# Patient Record
Sex: MALE | Race: WHITE | NOT HISPANIC OR LATINO | Employment: FULL TIME | URBAN - METROPOLITAN AREA
[De-identification: names, ages, dates, MRNs, and addresses within clinical notes are randomized per-mention and may not be internally consistent; named-entity substitution may affect disease eponyms.]

---

## 2017-02-02 ENCOUNTER — HOSPITAL ENCOUNTER (EMERGENCY)
Facility: HOSPITAL | Age: 22
Discharge: HOME | End: 2017-02-03
Attending: EMERGENCY MEDICINE | Admitting: EMERGENCY MEDICINE

## 2017-02-02 DIAGNOSIS — R04.0 EPISTAXIS: Primary | ICD-10-CM

## 2017-02-02 PROCEDURE — 99283 EMERGENCY DEPT VISIT LOW MDM: CPT

## 2017-02-03 ENCOUNTER — APPOINTMENT (OUTPATIENT)
Dept: GENERAL RADIOLOGY | Facility: HOSPITAL | Age: 22
End: 2017-02-03

## 2017-02-03 VITALS
OXYGEN SATURATION: 92 % | HEART RATE: 90 BPM | RESPIRATION RATE: 16 BRPM | SYSTOLIC BLOOD PRESSURE: 123 MMHG | TEMPERATURE: 96.4 F | HEIGHT: 71 IN | BODY MASS INDEX: 39.2 KG/M2 | WEIGHT: 280 LBS | DIASTOLIC BLOOD PRESSURE: 80 MMHG

## 2017-02-03 PROCEDURE — 71020 HC CHEST PA AND LATERAL: CPT

## 2017-02-03 NOTE — ED PROVIDER NOTES
EMERGENCY DEPARTMENT ENCOUNTER    CHIEF COMPLAINT  Chief Complaint: epistaxis and hemoptysis   History given by: patient   History limited by: nothing   Room Number: 14/14  PMD: No Known Provider    HPI:  Pt is a 22 y.o. male who presents complaining of intermittent, right sided epistaxis and hemoptysis for a few days. Pt denies chest pain, SOA, anausea, and any other sx at this time. Pt has had nosebleeds in the past, but never frequently until now. Pt states he has had 5-6 episode of epistaxis in the past few days.     Duration:  days  Timing: intermittent   Location: right nares  Radiation: does not radiate   Quality: new  Intensity/Severity: mild  Progression: unchanged   Associated Symptoms: epistaxis, hemoptysis   Aggravating Factors: none specified   Alleviating Factors: none specified   Previous Episodes: yes but never frequently until now  Treatment before arrival: none specified     PAST MEDICAL HISTORY  Active Ambulatory Problems     Diagnosis Date Noted   • No Active Ambulatory Problems     Resolved Ambulatory Problems     Diagnosis Date Noted   • No Resolved Ambulatory Problems     Past Medical History   Diagnosis Date   • Anxiety    • Bronchitis, acute    • Environmental and seasonal allergies    • Gingivitis        PAST SURGICAL HISTORY  Past Surgical History   Procedure Laterality Date   • Tonsillectomy         FAMILY HISTORY  History reviewed. No pertinent family history.    SOCIAL HISTORY  Social History     Social History   • Marital status: Single     Spouse name: N/A   • Number of children: N/A   • Years of education: N/A     Occupational History   • Not on file.     Social History Main Topics   • Smoking status: Never Smoker   • Smokeless tobacco: Never Used   • Alcohol use No   • Drug use: No   • Sexual activity: Not on file     Other Topics Concern   • Not on file     Social History Narrative       ALLERGIES  Dust mite extract and Pollen extract    REVIEW OF SYSTEMS  Review of Systems    Constitutional: Negative for activity change, appetite change and fever.   HENT: Positive for nosebleeds. Negative for congestion and sore throat.    Eyes: Negative.    Respiratory: Positive for cough (blood). Negative for shortness of breath.    Cardiovascular: Negative for chest pain and leg swelling.   Gastrointestinal: Negative for abdominal pain, diarrhea and vomiting.   Endocrine: Negative.    Genitourinary: Negative for decreased urine volume and dysuria.   Musculoskeletal: Negative for neck pain.   Skin: Negative for rash and wound.   Allergic/Immunologic: Negative.    Neurological: Negative for weakness, numbness and headaches.   Hematological: Negative.    Psychiatric/Behavioral: Negative.    All other systems reviewed and are negative.      PHYSICAL EXAM  ED Triage Vitals   Temp Heart Rate Resp BP SpO2   02/02/17 2137 02/02/17 2137 02/02/17 2144 02/02/17 2144 02/02/17 2137   96.4 °F (35.8 °C) 80 18 122/84 98 %      Temp src Heart Rate Source Patient Position BP Location FiO2 (%)   02/02/17 2137 02/02/17 2137 -- -- --   Tympanic Monitor          Physical Exam   Constitutional: He is oriented to person, place, and time and well-developed, well-nourished, and in no distress.   HENT:   Head: Normocephalic and atraumatic.   Nose: No epistaxis.   Moderate clear mucous in both nares.    Eyes: EOM are normal. Pupils are equal, round, and reactive to light.   Neck: Normal range of motion. Neck supple.   Cardiovascular: Normal rate, regular rhythm and normal heart sounds.    Pulmonary/Chest: Effort normal and breath sounds normal. No respiratory distress.   Abdominal: Soft. There is no tenderness. There is no rebound and no guarding.   Musculoskeletal: Normal range of motion. He exhibits no edema.   Neurological: He is alert and oriented to person, place, and time. He has normal sensation and normal strength.   Skin: Skin is warm and dry.   Psychiatric: Mood and affect normal.   Nursing note and vitals  reviewed.      RADIOLOGY  XR Chest 2 View   Preliminary Result   No acute findings.  No significant change.             I ordered the above noted radiological studies. Interpreted by radiologist. Reviewed by me in PACS.       PROCEDURES  Procedures      PROGRESS AND CONSULTS  ED Course     0039: I will order CXR since pt has complained of hemoptysis. Pt agrees to f/u with ENT.     0152: Rechecked pt. Pt is resting comfortably. Discussed negative CXR. I recommended that pt use Afrin and saline spray and f/u with ENT. Pt understands and agrees with plan and all questions were addressed.    MEDICAL DECISION MAKING  Results were reviewed/discussed with the patient and they were also made aware of online access. Pt also made aware that some labs, such as cultures, will not be resulted during ER visit and follow up with PMD is necessary.     MDM  Number of Diagnoses or Management Options  Epistaxis:      Amount and/or Complexity of Data Reviewed  Tests in the radiology section of CPT®: ordered and reviewed    Patient Progress  Patient progress: stable         DIAGNOSIS  Final diagnoses:   Epistaxis       DISPOSITION  DISCHARGE    Patient discharged in stable condition.    Reviewed implications of results, diagnosis, meds, responsibility to follow up, warning signs and symptoms of possible worsening, potential complications and reasons to return to ER.    Patient/Family voiced understanding of above instructions.    Discussed plan for discharge, as there is no emergent indication for admission.  Pt/family is agreeable and understands need for follow up and repeat testing.  Pt is aware that discharge does not mean that nothing is wrong but it indicates no emergency is present that requires admission and they must continue care with follow-up as given below or physician of their choice.     FOLLOW-UP  Antonio Villalobos MD  [address] [id number] [phone]    Schedule an appointment as soon as  possible for a visit           Medication List      Stop          HYDROcodone-acetaminophen 5-325 MG per tablet   Commonly known as:  NORCO       predniSONE 50 MG tablet   Commonly known as:  DELTASONE       promethazine-dextromethorphan 6.25-15 MG/5ML syrup   Commonly known as:  PROMETHAZINE-DM           Latest Documented Vital Signs:  As of 1:51 AM  BP- 133/83 HR- 86 Temp- 96.4 °F (35.8 °C) (Tympanic) O2 sat- 96%    --  Documentation assistance provided by sudeep Guo for Hubert Mendoza.  Information recorded by the scribe was done at my direction and has been verified and validated by me.         Vale Guo  02/03/17 0153       Hubert Mendoza MD  02/03/17 0703

## 2017-02-03 NOTE — ED NOTES
Patient states he has been having nose bleeds, is currently not having one but now when he coughs there is some blood in his sputum.      Praveena Squires RN  02/02/17 4466

## 2017-02-03 NOTE — DISCHARGE INSTRUCTIONS
Saline nasal spray every 2-3 hours while awake.  Afrin nasal spray 2 sprays each nostril twice daily.

## 2017-05-19 ENCOUNTER — HOSPITAL ENCOUNTER (EMERGENCY)
Facility: HOSPITAL | Age: 22
Discharge: HOME | End: 2017-05-19
Attending: EMERGENCY MEDICINE | Admitting: EMERGENCY MEDICINE

## 2017-05-19 ENCOUNTER — APPOINTMENT (OUTPATIENT)
Dept: GENERAL RADIOLOGY | Facility: HOSPITAL | Age: 22
End: 2017-05-19

## 2017-05-19 VITALS
TEMPERATURE: 97.9 F | SYSTOLIC BLOOD PRESSURE: 127 MMHG | OXYGEN SATURATION: 97 % | BODY MASS INDEX: 40.09 KG/M2 | HEIGHT: 70 IN | WEIGHT: 280 LBS | DIASTOLIC BLOOD PRESSURE: 80 MMHG | RESPIRATION RATE: 18 BRPM | HEART RATE: 85 BPM

## 2017-05-19 DIAGNOSIS — Z76.0 MEDICATION REFILL: ICD-10-CM

## 2017-05-19 DIAGNOSIS — J18.9 PNEUMONIA OF RIGHT MIDDLE LOBE DUE TO INFECTIOUS ORGANISM: Primary | ICD-10-CM

## 2017-05-19 LAB
ALBUMIN SERPL-MCNC: 4.1 G/DL (ref 3.5–5.2)
ALBUMIN/GLOB SERPL: 1 G/DL
ALP SERPL-CCNC: 58 U/L (ref 39–117)
ALT SERPL W P-5'-P-CCNC: 58 U/L (ref 1–41)
ANION GAP SERPL CALCULATED.3IONS-SCNC: 14.1 MMOL/L
AST SERPL-CCNC: 41 U/L (ref 1–40)
BASOPHILS # BLD AUTO: 0.03 10*3/MM3 (ref 0–0.2)
BASOPHILS NFR BLD AUTO: 0.3 % (ref 0–1.5)
BILIRUB SERPL-MCNC: 0.3 MG/DL (ref 0.1–1.2)
BUN BLD-MCNC: 8 MG/DL (ref 6–20)
BUN/CREAT SERPL: 9 (ref 7–25)
CALCIUM SPEC-SCNC: 9.4 MG/DL (ref 8.6–10.5)
CHLORIDE SERPL-SCNC: 104 MMOL/L (ref 98–107)
CO2 SERPL-SCNC: 20.9 MMOL/L (ref 22–29)
CREAT BLD-MCNC: 0.89 MG/DL (ref 0.76–1.27)
DEPRECATED RDW RBC AUTO: 42 FL (ref 37–54)
EOSINOPHIL # BLD AUTO: 0.27 10*3/MM3 (ref 0–0.7)
EOSINOPHIL NFR BLD AUTO: 2.7 % (ref 0.3–6.2)
ERYTHROCYTE [DISTWIDTH] IN BLOOD BY AUTOMATED COUNT: 13.3 % (ref 11.5–14.5)
GFR SERPL CREATININE-BSD FRML MDRD: 109 ML/MIN/1.73
GLOBULIN UR ELPH-MCNC: 4.1 GM/DL
GLUCOSE BLD-MCNC: 105 MG/DL (ref 65–99)
HCT VFR BLD AUTO: 44.7 % (ref 40.4–52.2)
HGB BLD-MCNC: 15.3 G/DL (ref 13.7–17.6)
IMM GRANULOCYTES # BLD: 0.08 10*3/MM3 (ref 0–0.03)
IMM GRANULOCYTES NFR BLD: 0.8 % (ref 0–0.5)
LYMPHOCYTES # BLD AUTO: 2.65 10*3/MM3 (ref 0.9–4.8)
LYMPHOCYTES NFR BLD AUTO: 26.7 % (ref 19.6–45.3)
MCH RBC QN AUTO: 29.5 PG (ref 27–32.7)
MCHC RBC AUTO-ENTMCNC: 34.2 G/DL (ref 32.6–36.4)
MCV RBC AUTO: 86.3 FL (ref 79.8–96.2)
MONOCYTES # BLD AUTO: 0.92 10*3/MM3 (ref 0.2–1.2)
MONOCYTES NFR BLD AUTO: 9.3 % (ref 5–12)
NEUTROPHILS # BLD AUTO: 5.98 10*3/MM3 (ref 1.9–8.1)
NEUTROPHILS NFR BLD AUTO: 60.2 % (ref 42.7–76)
PLATELET # BLD AUTO: 259 10*3/MM3 (ref 140–500)
PMV BLD AUTO: 9.8 FL (ref 6–12)
POTASSIUM BLD-SCNC: 3.8 MMOL/L (ref 3.5–5.2)
PROT SERPL-MCNC: 8.2 G/DL (ref 6–8.5)
RBC # BLD AUTO: 5.18 10*6/MM3 (ref 4.6–6)
SODIUM BLD-SCNC: 139 MMOL/L (ref 136–145)
WBC NRBC COR # BLD: 9.93 10*3/MM3 (ref 4.5–10.7)

## 2017-05-19 PROCEDURE — 71020 HC CHEST PA AND LATERAL: CPT

## 2017-05-19 PROCEDURE — 99283 EMERGENCY DEPT VISIT LOW MDM: CPT

## 2017-05-19 PROCEDURE — 85025 COMPLETE CBC W/AUTO DIFF WBC: CPT | Performed by: NURSE PRACTITIONER

## 2017-05-19 PROCEDURE — 80053 COMPREHEN METABOLIC PANEL: CPT | Performed by: NURSE PRACTITIONER

## 2017-05-19 RX ORDER — DOXYCYCLINE 100 MG/1
100 CAPSULE ORAL 2 TIMES DAILY
Qty: 14 CAPSULE | Refills: 0 | Status: SHIPPED | OUTPATIENT
Start: 2017-05-19 | End: 2017-06-02 | Stop reason: HOSPADM

## 2017-05-19 RX ORDER — BENZONATATE 100 MG/1
100 CAPSULE ORAL 3 TIMES DAILY PRN
Qty: 21 CAPSULE | Refills: 0 | Status: SHIPPED | OUTPATIENT
Start: 2017-05-19 | End: 2018-09-16

## 2017-05-19 RX ORDER — PAROXETINE HYDROCHLORIDE 20 MG/1
20 TABLET, FILM COATED ORAL EVERY MORNING
Qty: 30 TABLET | Refills: 0 | Status: SHIPPED | OUTPATIENT
Start: 2017-05-19 | End: 2018-09-16

## 2017-05-31 ENCOUNTER — APPOINTMENT (OUTPATIENT)
Dept: CT IMAGING | Facility: HOSPITAL | Age: 22
End: 2017-05-31

## 2017-05-31 ENCOUNTER — HOSPITAL ENCOUNTER (INPATIENT)
Facility: HOSPITAL | Age: 22
LOS: 2 days | Discharge: HOME | End: 2017-06-02
Attending: EMERGENCY MEDICINE | Admitting: INTERNAL MEDICINE

## 2017-05-31 ENCOUNTER — APPOINTMENT (OUTPATIENT)
Dept: GENERAL RADIOLOGY | Facility: HOSPITAL | Age: 22
End: 2017-05-31

## 2017-05-31 DIAGNOSIS — A41.9 SEPSIS, DUE TO UNSPECIFIED ORGANISM: ICD-10-CM

## 2017-05-31 DIAGNOSIS — J18.9 PNEUMONIA OF BOTH LUNGS DUE TO INFECTIOUS ORGANISM, UNSPECIFIED PART OF LUNG: Primary | ICD-10-CM

## 2017-05-31 PROBLEM — E87.20 ACIDOSIS: Status: ACTIVE | Noted: 2017-05-31

## 2017-05-31 PROBLEM — R19.7 ACUTE DIARRHEA: Status: ACTIVE | Noted: 2017-05-31

## 2017-05-31 PROBLEM — R10.31 RLQ ABDOMINAL PAIN: Status: ACTIVE | Noted: 2017-05-31

## 2017-05-31 LAB
ALBUMIN SERPL-MCNC: 4.6 G/DL (ref 3.5–5.2)
ALBUMIN/GLOB SERPL: 1.2 G/DL
ALP SERPL-CCNC: 51 U/L (ref 39–117)
ALT SERPL W P-5'-P-CCNC: 68 U/L (ref 1–41)
ANION GAP SERPL CALCULATED.3IONS-SCNC: 17.9 MMOL/L
AST SERPL-CCNC: 48 U/L (ref 1–40)
B PERT DNA SPEC QL NAA+PROBE: NOT DETECTED
BASOPHILS # BLD AUTO: 0.02 10*3/MM3 (ref 0–0.2)
BASOPHILS NFR BLD AUTO: 0.1 % (ref 0–1.5)
BILIRUB SERPL-MCNC: 1.1 MG/DL (ref 0.1–1.2)
BUN BLD-MCNC: 9 MG/DL (ref 6–20)
BUN/CREAT SERPL: 8.7 (ref 7–25)
C PNEUM DNA NPH QL NAA+NON-PROBE: NOT DETECTED
CALCIUM SPEC-SCNC: 9.6 MG/DL (ref 8.6–10.5)
CHLORIDE SERPL-SCNC: 95 MMOL/L (ref 98–107)
CO2 SERPL-SCNC: 19.1 MMOL/L (ref 22–29)
CREAT BLD-MCNC: 1.03 MG/DL (ref 0.76–1.27)
D-LACTATE SERPL-SCNC: 3.2 MMOL/L (ref 0.5–2)
DEPRECATED RDW RBC AUTO: 39.3 FL (ref 37–54)
EOSINOPHIL # BLD AUTO: 0.01 10*3/MM3 (ref 0–0.7)
EOSINOPHIL NFR BLD AUTO: 0.1 % (ref 0.3–6.2)
ERYTHROCYTE [DISTWIDTH] IN BLOOD BY AUTOMATED COUNT: 13.2 % (ref 11.5–14.5)
FLUAV H1 2009 PAND RNA NPH QL NAA+PROBE: NOT DETECTED
FLUAV H1 HA GENE NPH QL NAA+PROBE: NOT DETECTED
FLUAV H3 RNA NPH QL NAA+PROBE: NOT DETECTED
FLUAV SUBTYP SPEC NAA+PROBE: NOT DETECTED
FLUBV RNA ISLT QL NAA+PROBE: NOT DETECTED
GFR SERPL CREATININE-BSD FRML MDRD: 92 ML/MIN/1.73
GLOBULIN UR ELPH-MCNC: 3.9 GM/DL
GLUCOSE BLD-MCNC: 116 MG/DL (ref 65–99)
HADV DNA SPEC NAA+PROBE: NOT DETECTED
HCOV 229E RNA SPEC QL NAA+PROBE: NOT DETECTED
HCOV HKU1 RNA SPEC QL NAA+PROBE: NOT DETECTED
HCOV NL63 RNA SPEC QL NAA+PROBE: NOT DETECTED
HCOV OC43 RNA SPEC QL NAA+PROBE: NOT DETECTED
HCT VFR BLD AUTO: 44.8 % (ref 40.4–52.2)
HGB BLD-MCNC: 15.9 G/DL (ref 13.7–17.6)
HMPV RNA NPH QL NAA+NON-PROBE: NOT DETECTED
HPIV1 RNA SPEC QL NAA+PROBE: NOT DETECTED
HPIV2 RNA SPEC QL NAA+PROBE: NOT DETECTED
HPIV3 RNA NPH QL NAA+PROBE: NOT DETECTED
HPIV4 P GENE NPH QL NAA+PROBE: NOT DETECTED
IMM GRANULOCYTES # BLD: 0.06 10*3/MM3 (ref 0–0.03)
IMM GRANULOCYTES NFR BLD: 0.3 % (ref 0–0.5)
LYMPHOCYTES # BLD AUTO: 0.82 10*3/MM3 (ref 0.9–4.8)
LYMPHOCYTES NFR BLD AUTO: 4.4 % (ref 19.6–45.3)
M PNEUMO IGG SER IA-ACNC: NOT DETECTED
MCH RBC QN AUTO: 29.4 PG (ref 27–32.7)
MCHC RBC AUTO-ENTMCNC: 35.5 G/DL (ref 32.6–36.4)
MCV RBC AUTO: 83 FL (ref 79.8–96.2)
MONOCYTES # BLD AUTO: 0.57 10*3/MM3 (ref 0.2–1.2)
MONOCYTES NFR BLD AUTO: 3.1 % (ref 5–12)
NEUTROPHILS # BLD AUTO: 17.16 10*3/MM3 (ref 1.9–8.1)
NEUTROPHILS NFR BLD AUTO: 92 % (ref 42.7–76)
PLAT MORPH BLD: NORMAL
PLATELET # BLD AUTO: 268 10*3/MM3 (ref 140–500)
PMV BLD AUTO: 9.8 FL (ref 6–12)
POTASSIUM BLD-SCNC: 3.9 MMOL/L (ref 3.5–5.2)
PROCALCITONIN SERPL-MCNC: 2.51 NG/ML (ref 0.1–0.25)
PROT SERPL-MCNC: 8.5 G/DL (ref 6–8.5)
RBC # BLD AUTO: 5.4 10*6/MM3 (ref 4.6–6)
RBC MORPH BLD: NORMAL
RHINOVIRUS RNA SPEC NAA+PROBE: NOT DETECTED
RSV RNA NPH QL NAA+NON-PROBE: NOT DETECTED
SODIUM BLD-SCNC: 132 MMOL/L (ref 136–145)
WBC MORPH BLD: NORMAL
WBC NRBC COR # BLD: 18.64 10*3/MM3 (ref 4.5–10.7)

## 2017-05-31 PROCEDURE — 36415 COLL VENOUS BLD VENIPUNCTURE: CPT

## 2017-05-31 PROCEDURE — 0 IOPAMIDOL 61 % SOLUTION: Performed by: INTERNAL MEDICINE

## 2017-05-31 PROCEDURE — 84145 PROCALCITONIN (PCT): CPT | Performed by: EMERGENCY MEDICINE

## 2017-05-31 PROCEDURE — 87581 M.PNEUMON DNA AMP PROBE: CPT | Performed by: EMERGENCY MEDICINE

## 2017-05-31 PROCEDURE — 87486 CHLMYD PNEUM DNA AMP PROBE: CPT | Performed by: EMERGENCY MEDICINE

## 2017-05-31 PROCEDURE — 85025 COMPLETE CBC W/AUTO DIFF WBC: CPT | Performed by: EMERGENCY MEDICINE

## 2017-05-31 PROCEDURE — 80053 COMPREHEN METABOLIC PANEL: CPT | Performed by: EMERGENCY MEDICINE

## 2017-05-31 PROCEDURE — 87633 RESP VIRUS 12-25 TARGETS: CPT | Performed by: EMERGENCY MEDICINE

## 2017-05-31 PROCEDURE — 74177 CT ABD & PELVIS W/CONTRAST: CPT

## 2017-05-31 PROCEDURE — 87798 DETECT AGENT NOS DNA AMP: CPT | Performed by: EMERGENCY MEDICINE

## 2017-05-31 PROCEDURE — 25010000002 PIPERACILLIN SOD-TAZOBACTAM PER 1 G: Performed by: EMERGENCY MEDICINE

## 2017-05-31 PROCEDURE — 70450 CT HEAD/BRAIN W/O DYE: CPT

## 2017-05-31 PROCEDURE — 87040 BLOOD CULTURE FOR BACTERIA: CPT | Performed by: EMERGENCY MEDICINE

## 2017-05-31 PROCEDURE — 71020 HC CHEST PA AND LATERAL: CPT

## 2017-05-31 PROCEDURE — 71260 CT THORAX DX C+: CPT

## 2017-05-31 PROCEDURE — 85007 BL SMEAR W/DIFF WBC COUNT: CPT | Performed by: EMERGENCY MEDICINE

## 2017-05-31 PROCEDURE — 83605 ASSAY OF LACTIC ACID: CPT | Performed by: EMERGENCY MEDICINE

## 2017-05-31 PROCEDURE — 99284 EMERGENCY DEPT VISIT MOD MDM: CPT

## 2017-05-31 RX ORDER — CEFTRIAXONE SODIUM 1 G/50ML
1 INJECTION, SOLUTION INTRAVENOUS EVERY 24 HOURS
Status: DISCONTINUED | OUTPATIENT
Start: 2017-05-31 | End: 2017-06-02 | Stop reason: HOSPADM

## 2017-05-31 RX ORDER — ACETAMINOPHEN 500 MG
1000 TABLET ORAL ONCE
Status: COMPLETED | OUTPATIENT
Start: 2017-05-31 | End: 2017-05-31

## 2017-05-31 RX ORDER — ALBUTEROL SULFATE 2.5 MG/3ML
2.5 SOLUTION RESPIRATORY (INHALATION) EVERY 6 HOURS PRN
Status: DISCONTINUED | OUTPATIENT
Start: 2017-05-31 | End: 2017-06-02 | Stop reason: HOSPADM

## 2017-05-31 RX ORDER — PAROXETINE HYDROCHLORIDE 20 MG/1
20 TABLET, FILM COATED ORAL EVERY MORNING
Status: DISCONTINUED | OUTPATIENT
Start: 2017-06-01 | End: 2017-06-02 | Stop reason: HOSPADM

## 2017-05-31 RX ORDER — ACETAMINOPHEN 325 MG/1
650 TABLET ORAL EVERY 4 HOURS PRN
Status: DISCONTINUED | OUTPATIENT
Start: 2017-05-31 | End: 2017-06-02 | Stop reason: HOSPADM

## 2017-05-31 RX ORDER — CETIRIZINE HYDROCHLORIDE 10 MG/1
10 TABLET ORAL DAILY
Status: DISCONTINUED | OUTPATIENT
Start: 2017-06-01 | End: 2017-06-02 | Stop reason: HOSPADM

## 2017-05-31 RX ORDER — GUAIFENESIN 600 MG/1
1200 TABLET, EXTENDED RELEASE ORAL 2 TIMES DAILY
Status: DISCONTINUED | OUTPATIENT
Start: 2017-06-01 | End: 2017-06-02

## 2017-05-31 RX ORDER — ONDANSETRON 2 MG/ML
4 INJECTION INTRAMUSCULAR; INTRAVENOUS EVERY 6 HOURS PRN
Status: DISCONTINUED | OUTPATIENT
Start: 2017-05-31 | End: 2017-06-02 | Stop reason: HOSPADM

## 2017-05-31 RX ORDER — SODIUM CHLORIDE 0.9 % (FLUSH) 0.9 %
1-10 SYRINGE (ML) INJECTION AS NEEDED
Status: DISCONTINUED | OUTPATIENT
Start: 2017-05-31 | End: 2017-06-02 | Stop reason: HOSPADM

## 2017-05-31 RX ORDER — SODIUM CHLORIDE 9 MG/ML
125 INJECTION, SOLUTION INTRAVENOUS CONTINUOUS
Status: DISCONTINUED | OUTPATIENT
Start: 2017-05-31 | End: 2017-06-02 | Stop reason: HOSPADM

## 2017-05-31 RX ADMIN — SODIUM CHLORIDE 1000 ML: 9 INJECTION, SOLUTION INTRAVENOUS at 20:26

## 2017-05-31 RX ADMIN — TAZOBACTAM SODIUM AND PIPERACILLIN SODIUM 4.5 G: 500; 4 INJECTION, SOLUTION INTRAVENOUS at 21:41

## 2017-05-31 RX ADMIN — SODIUM CHLORIDE 3810 ML: 9 INJECTION, SOLUTION INTRAVENOUS at 21:43

## 2017-05-31 RX ADMIN — DOXYCYCLINE 100 MG: 100 INJECTION, POWDER, LYOPHILIZED, FOR SOLUTION INTRAVENOUS at 22:17

## 2017-05-31 RX ADMIN — IOPAMIDOL 85 ML: 612 INJECTION, SOLUTION INTRAVENOUS at 23:25

## 2017-05-31 RX ADMIN — ACETAMINOPHEN 1000 MG: 500 TABLET ORAL at 21:01

## 2017-06-01 LAB
ALBUMIN SERPL-MCNC: 3.5 G/DL (ref 3.5–5.2)
ALBUMIN/GLOB SERPL: 1 G/DL
ALP SERPL-CCNC: 39 U/L (ref 39–117)
ALT SERPL W P-5'-P-CCNC: 48 U/L (ref 1–41)
ANION GAP SERPL CALCULATED.3IONS-SCNC: 14.7 MMOL/L
AST SERPL-CCNC: 27 U/L (ref 1–40)
BASOPHILS # BLD AUTO: 0.02 10*3/MM3 (ref 0–0.2)
BASOPHILS NFR BLD AUTO: 0.1 % (ref 0–1.5)
BILIRUB SERPL-MCNC: 0.8 MG/DL (ref 0.1–1.2)
BUN BLD-MCNC: 9 MG/DL (ref 6–20)
BUN/CREAT SERPL: 9.7 (ref 7–25)
C DIFF TOX GENS STL QL NAA+PROBE: NEGATIVE
CALCIUM SPEC-SCNC: 8.5 MG/DL (ref 8.6–10.5)
CHLORIDE SERPL-SCNC: 107 MMOL/L (ref 98–107)
CO2 SERPL-SCNC: 19.3 MMOL/L (ref 22–29)
CREAT BLD-MCNC: 0.93 MG/DL (ref 0.76–1.27)
D-LACTATE SERPL-SCNC: 1.4 MMOL/L (ref 0.5–2)
D-LACTATE SERPL-SCNC: 2.8 MMOL/L (ref 0.5–2)
DEPRECATED RDW RBC AUTO: 40 FL (ref 37–54)
EOSINOPHIL # BLD AUTO: 0.05 10*3/MM3 (ref 0–0.7)
EOSINOPHIL NFR BLD AUTO: 0.2 % (ref 0.3–6.2)
ERYTHROCYTE [DISTWIDTH] IN BLOOD BY AUTOMATED COUNT: 13.4 % (ref 11.5–14.5)
GFR SERPL CREATININE-BSD FRML MDRD: 104 ML/MIN/1.73
GLOBULIN UR ELPH-MCNC: 3.4 GM/DL
GLUCOSE BLD-MCNC: 126 MG/DL (ref 65–99)
HCT VFR BLD AUTO: 38.3 % (ref 40.4–52.2)
HGB BLD-MCNC: 13.6 G/DL (ref 13.7–17.6)
IMM GRANULOCYTES # BLD: 0.08 10*3/MM3 (ref 0–0.03)
IMM GRANULOCYTES NFR BLD: 0.4 % (ref 0–0.5)
L PNEUMO1 AG UR QL IA: NEGATIVE
LYMPHOCYTES # BLD AUTO: 2.92 10*3/MM3 (ref 0.9–4.8)
LYMPHOCYTES NFR BLD AUTO: 14.4 % (ref 19.6–45.3)
MCH RBC QN AUTO: 29.3 PG (ref 27–32.7)
MCHC RBC AUTO-ENTMCNC: 35.5 G/DL (ref 32.6–36.4)
MCV RBC AUTO: 82.5 FL (ref 79.8–96.2)
MONOCYTES # BLD AUTO: 0.69 10*3/MM3 (ref 0.2–1.2)
MONOCYTES NFR BLD AUTO: 3.4 % (ref 5–12)
NEUTROPHILS # BLD AUTO: 16.55 10*3/MM3 (ref 1.9–8.1)
NEUTROPHILS NFR BLD AUTO: 81.5 % (ref 42.7–76)
PLATELET # BLD AUTO: 238 10*3/MM3 (ref 140–500)
PMV BLD AUTO: 9.7 FL (ref 6–12)
POTASSIUM BLD-SCNC: 3.7 MMOL/L (ref 3.5–5.2)
PROT SERPL-MCNC: 6.9 G/DL (ref 6–8.5)
RBC # BLD AUTO: 4.64 10*6/MM3 (ref 4.6–6)
S PNEUM AG SPEC QL LA: NEGATIVE
SODIUM BLD-SCNC: 141 MMOL/L (ref 136–145)
WBC NRBC COR # BLD: 20.31 10*3/MM3 (ref 4.5–10.7)

## 2017-06-01 PROCEDURE — 97161 PT EVAL LOW COMPLEX 20 MIN: CPT

## 2017-06-01 PROCEDURE — 87493 C DIFF AMPLIFIED PROBE: CPT | Performed by: INTERNAL MEDICINE

## 2017-06-01 PROCEDURE — 87070 CULTURE OTHR SPECIMN AEROBIC: CPT | Performed by: INTERNAL MEDICINE

## 2017-06-01 PROCEDURE — G8978 MOBILITY CURRENT STATUS: HCPCS

## 2017-06-01 PROCEDURE — 87449 NOS EACH ORGANISM AG IA: CPT | Performed by: INTERNAL MEDICINE

## 2017-06-01 PROCEDURE — 94640 AIRWAY INHALATION TREATMENT: CPT

## 2017-06-01 PROCEDURE — 36415 COLL VENOUS BLD VENIPUNCTURE: CPT | Performed by: EMERGENCY MEDICINE

## 2017-06-01 PROCEDURE — G8979 MOBILITY GOAL STATUS: HCPCS

## 2017-06-01 PROCEDURE — 83605 ASSAY OF LACTIC ACID: CPT | Performed by: INTERNAL MEDICINE

## 2017-06-01 PROCEDURE — 87205 SMEAR GRAM STAIN: CPT | Performed by: INTERNAL MEDICINE

## 2017-06-01 PROCEDURE — G8980 MOBILITY D/C STATUS: HCPCS

## 2017-06-01 PROCEDURE — 25010000003 CEFTRIAXONE PER 250 MG: Performed by: INTERNAL MEDICINE

## 2017-06-01 PROCEDURE — 80053 COMPREHEN METABOLIC PANEL: CPT | Performed by: INTERNAL MEDICINE

## 2017-06-01 PROCEDURE — 85025 COMPLETE CBC W/AUTO DIFF WBC: CPT | Performed by: INTERNAL MEDICINE

## 2017-06-01 PROCEDURE — 83605 ASSAY OF LACTIC ACID: CPT | Performed by: EMERGENCY MEDICINE

## 2017-06-01 PROCEDURE — 87899 AGENT NOS ASSAY W/OPTIC: CPT | Performed by: INTERNAL MEDICINE

## 2017-06-01 RX ORDER — BENZONATATE 200 MG/1
200 CAPSULE ORAL 3 TIMES DAILY PRN
Status: DISCONTINUED | OUTPATIENT
Start: 2017-06-01 | End: 2017-06-02 | Stop reason: HOSPADM

## 2017-06-01 RX ORDER — IPRATROPIUM BROMIDE AND ALBUTEROL SULFATE 2.5; .5 MG/3ML; MG/3ML
3 SOLUTION RESPIRATORY (INHALATION)
Status: DISCONTINUED | OUTPATIENT
Start: 2017-06-01 | End: 2017-06-02 | Stop reason: HOSPADM

## 2017-06-01 RX ADMIN — CEFTRIAXONE SODIUM 1 G: 1 INJECTION, SOLUTION INTRAVENOUS at 20:06

## 2017-06-01 RX ADMIN — SODIUM CHLORIDE 125 ML/HR: 9 INJECTION, SOLUTION INTRAVENOUS at 10:09

## 2017-06-01 RX ADMIN — AZITHROMYCIN DIHYDRATE 500 MG: 500 INJECTION, POWDER, LYOPHILIZED, FOR SOLUTION INTRAVENOUS at 23:35

## 2017-06-01 RX ADMIN — SODIUM CHLORIDE 125 ML/HR: 9 INJECTION, SOLUTION INTRAVENOUS at 01:15

## 2017-06-01 RX ADMIN — VANCOMYCIN HYDROCHLORIDE 2500 MG: 1 INJECTION, POWDER, LYOPHILIZED, FOR SOLUTION INTRAVENOUS at 01:15

## 2017-06-01 RX ADMIN — GUAIFENESIN 1200 MG: 600 TABLET, EXTENDED RELEASE ORAL at 08:25

## 2017-06-01 RX ADMIN — SODIUM CHLORIDE 125 ML/HR: 9 INJECTION, SOLUTION INTRAVENOUS at 17:48

## 2017-06-01 RX ADMIN — GUAIFENESIN 1200 MG: 600 TABLET, EXTENDED RELEASE ORAL at 17:47

## 2017-06-01 RX ADMIN — CETIRIZINE HYDROCHLORIDE 10 MG: 10 TABLET, FILM COATED ORAL at 08:25

## 2017-06-01 RX ADMIN — PAROXETINE HYDROCHLORIDE HEMIHYDRATE 20 MG: 20 TABLET, FILM COATED ORAL at 08:25

## 2017-06-01 RX ADMIN — IPRATROPIUM BROMIDE AND ALBUTEROL SULFATE 3 ML: .5; 3 SOLUTION RESPIRATORY (INHALATION) at 21:52

## 2017-06-01 RX ADMIN — CEFTRIAXONE SODIUM 1 G: 1 INJECTION, SOLUTION INTRAVENOUS at 06:59

## 2017-06-01 RX ADMIN — AZITHROMYCIN DIHYDRATE 500 MG: 500 INJECTION, POWDER, LYOPHILIZED, FOR SOLUTION INTRAVENOUS at 07:00

## 2017-06-01 NOTE — ED PROVIDER NOTES
EMERGENCY DEPARTMENT ENCOUNTER    CHIEF COMPLAINT  Chief Complaint: SOA  History given by: pt  History limited by: nothing  Room Number: 22/22  PMD: No Known Provider      HPI:  Pt is a 22 y.o. male who presents complaining of a worsening SOA onset today. Pt also reports a fever & cough. Pt was seen in the ED on 5/19/17 & diagnosed w/ pneumonia. Pt was discharged w/ doxycycline & states he took it as prescribed. Pt states he was feeling better while on the abx, but began to decline after he finished them.        Duration:  1 day  Onset: gradual  Timing: constant  Intensity/Severity: moderate  Progression: worsening  Associated Symptoms: fever & cough  Aggravating Factors: none reported  Alleviating Factors: none reported  Previous Episodes: Pt was seen in the ED on 5/19/17 & diagnosed w/ pneumonia.  Treatment before arrival: Pt was discharged w/ doxycycline & states he took it as prescribed.    PAST MEDICAL HISTORY  Active Ambulatory Problems     Diagnosis Date Noted   • No Active Ambulatory Problems     Resolved Ambulatory Problems     Diagnosis Date Noted   • No Resolved Ambulatory Problems     Past Medical History:   Diagnosis Date   • Anxiety    • Bronchitis, acute    • Environmental and seasonal allergies    • Gingivitis    • Pneumonia        PAST SURGICAL HISTORY  Past Surgical History:   Procedure Laterality Date   • TONSILLECTOMY         FAMILY HISTORY  History reviewed. No pertinent family history.    SOCIAL HISTORY  Social History     Social History   • Marital status: Single     Spouse name: N/A   • Number of children: N/A   • Years of education: N/A     Occupational History   • Not on file.     Social History Main Topics   • Smoking status: Never Smoker   • Smokeless tobacco: Never Used   • Alcohol use No   • Drug use: No   • Sexual activity: Defer     Other Topics Concern   • Not on file     Social History Narrative   • No narrative on file       ALLERGIES  Dust mite extract and Pollen  extract    REVIEW OF SYSTEMS  Review of Systems   Constitutional: Positive for fever. Negative for activity change and appetite change.   HENT: Negative for congestion and sore throat.    Eyes: Negative.    Respiratory: Positive for cough and shortness of breath.    Cardiovascular: Negative for chest pain and leg swelling.   Gastrointestinal: Negative for abdominal pain, diarrhea and vomiting.   Endocrine: Negative.    Genitourinary: Negative for decreased urine volume and dysuria.   Musculoskeletal: Negative for neck pain.   Skin: Negative for rash and wound.   Allergic/Immunologic: Negative.    Neurological: Negative for weakness, numbness and headaches.   Hematological: Negative.    Psychiatric/Behavioral: Negative.    All other systems reviewed and are negative.      PHYSICAL EXAM  ED Triage Vitals   Temp Heart Rate Resp BP SpO2   05/31/17 1948 05/31/17 1958 05/31/17 1948 05/31/17 1958 05/31/17 1948   100.7 °F (38.2 °C) 135 20 125/92 91 %      Temp src Heart Rate Source Patient Position BP Location FiO2 (%)   05/31/17 2016 -- 05/31/17 1958 05/31/17 1958 --   Oral  Sitting Left arm        Physical Exam   Constitutional: He is oriented to person, place, and time and well-developed, well-nourished, and in no distress.   HENT:   Head: Normocephalic and atraumatic.   Eyes: Pupils are equal, round, and reactive to light.   Neck: Normal range of motion. Neck supple.   Cardiovascular: Normal rate and regular rhythm.    No murmur heard.  Pulmonary/Chest: Effort normal. No respiratory distress.   Coarse breath sounds in all lung fields.   Abdominal: Soft. There is no tenderness. There is no rebound and no guarding.   Musculoskeletal: Normal range of motion. He exhibits no edema.   Neurological: He is alert and oriented to person, place, and time.   Skin: Skin is warm and dry. No rash noted.   Psychiatric: Mood and affect normal.   Nursing note and vitals reviewed.      LAB RESULTS  Lab Results (last 24 hours)      Procedure Component Value Units Date/Time    CBC & Differential [[id number]] Collected:  05/31/17 2030    Specimen:  Blood Updated:  05/31/17 2100    Narrative:       The following orders were created for panel order CBC & Differential.  Procedure                               Abnormality         Status                     ---------                               -----------         ------                     Scan Slide[[id number]]                    Normal              Final result               CBC Auto Differential[[id number]]         Abnormal            Final result                 Please view results for these tests on the individual orders.    Comprehensive Metabolic Panel [[id number]]  (Abnormal) Collected:  05/31/17 2030    Specimen:  Blood Updated:  05/31/17 2105     Glucose 116 (H) mg/dL      BUN 9 mg/dL      Creatinine 1.03 mg/dL      Sodium 132 (L) mmol/L      Potassium 3.9 mmol/L      Chloride 95 (L) mmol/L      CO2 19.1 (L) mmol/L      Calcium 9.6 mg/dL      Total Protein 8.5 g/dL      Albumin 4.60 g/dL      ALT (SGPT) 68 (H) U/L      AST (SGOT) 48 (H) U/L      Alkaline Phosphatase 51 U/L      Total Bilirubin 1.1 mg/dL      eGFR Non African Amer 92 mL/min/1.73      Globulin 3.9 gm/dL      A/G Ratio 1.2 g/dL      BUN/Creatinine Ratio 8.7     Anion Gap 17.9 mmol/L     Procalcitonin [[id number]]  (Abnormal) Collected:  05/31/17 2030    Specimen:  Blood Updated:  05/31/17 2113     Procalcitonin 2.51 (C) ng/mL     Narrative:       As a Marker for Sepsis (Non-Neonates):   1. <0.5 ng/mL represents a low risk of severe sepsis and/or septic shock.  1. >2 ng/mL represents a high risk of severe sepsis and/or septic shock.    As a Marker for Lower Respiratory Tract Infections that require antibiotic therapy:  PCT on Admission     Antibiotic Therapy             6-12 Hrs later  > 0.5                Strongly Recommended            >0.25 - <0.5         Recommended  0.1 - 0.25           Discouraged                    "Remeasure/reassess PCT  <0.1                 Strongly Discouraged          Remeasure/reassess PCT      As 28 day mortality risk marker: \"Change in Procalcitonin Result\" (> 80 % or <=80 %) if Day 0 (or Day 1) and Day 4 values are available. Refer to http://www.[facility]-pct-calculator.com/   Change in PCT <=80 %   A decrease of PCT levels below or equal to 80 % defines a positive change in PCT test result representing a higher risk for 28-day all-cause mortality of patients diagnosed with severe sepsis or septic shock.  Change in PCT > 80 %   A decrease of PCT levels of more than 80 % defines a negative change in PCT result representing a lower risk for 28-day all-cause mortality of patients diagnosed with severe sepsis or septic shock.                Lactic Acid, Plasma [[id number]]  (Abnormal) Collected:  05/31/17 2030    Specimen:  Blood Updated:  05/31/17 2112     Lactate 3.2 (C) mmol/L     Blood Culture [[id number]] Collected:  05/31/17 2030    Specimen:  Blood from Arm, Left Updated:  05/31/17 2036    CBC Auto Differential [[id number]]  (Abnormal) Collected:  05/31/17 2030    Specimen:  Blood Updated:  05/31/17 2100     WBC 18.64 (H) 10*3/mm3      RBC 5.40 10*6/mm3      Hemoglobin 15.9 g/dL      Hematocrit 44.8 %      MCV 83.0 fL      MCH 29.4 pg      MCHC 35.5 g/dL      RDW 13.2 %      RDW-SD 39.3 fl      MPV 9.8 fL      Platelets 268 10*3/mm3      Neutrophil % 92.0 (H) %      Lymphocyte % 4.4 (L) %      Monocyte % 3.1 (L) %      Eosinophil % 0.1 (L) %      Basophil % 0.1 %      Immature Grans % 0.3 %      Neutrophils, Absolute 17.16 (H) 10*3/mm3      Lymphocytes, Absolute 0.82 (L) 10*3/mm3      Monocytes, Absolute 0.57 10*3/mm3      Eosinophils, Absolute 0.01 10*3/mm3      Basophils, Absolute 0.02 10*3/mm3      Immature Grans, Absolute 0.06 (H) 10*3/mm3     Scan Slide [[id number]]  (Normal) Collected:  05/31/17 2030    Specimen:  Blood Updated:  05/31/17 2100     RBC Morphology Normal     WBC Morphology Normal     " Platelet Morphology Normal    Respiratory Panel, PCR [[id number]] Collected:  05/31/17 2040    Specimen:  Swab from Nares Updated:  05/31/17 2044    Blood Culture [[id number]] Collected:  05/31/17 2049    Specimen:  Blood from Arm, Left Updated:  05/31/17 2055          I ordered the above labs and reviewed the results    RADIOLOGY  XR Chest 2 View   Preliminary Result   No acute findings.              CT Head Without Contrast    (Results Pending)        I ordered the above noted radiological studies. Interpreted by radiologist. Reviewed by me in PACS.       PROCEDURES  Procedures      PROGRESS AND CONSULTS  ED Course     2018 Ordered CBC, CMP, blood cultures, procal, lactic acid, & respiratory panel for further evaluation.    2107 Discussed pt's case w/ Dr. Busby (pulmonology).    2119 Ordered vancomycin, doxycycline, & zosyn and IV fluids.    2125 Discussed pt' case w/ Dr. De Oliveira ([facility]) who agrees to admit pt.    2132 Ordered CT head for further evaluation.    MEDICAL DECISION MAKING  Results were reviewed/discussed with the patient and they were also made aware of online access. Pt also made aware that some labs, such as cultures, will not be resulted during ER visit and follow up with PMD is necessary.     MDM  Number of Diagnoses or Management Options     Amount and/or Complexity of Data Reviewed  Clinical lab tests: ordered and reviewed (Procal=2.51 & lactate=3.2)  Tests in the radiology section of CPT®: ordered and reviewed (CXR showed no acute findings.)  Decide to obtain previous medical records or to obtain history from someone other than the patient: yes  Review and summarize past medical records: yes (Pt was seen in the ED on 5/19/17 & diagnosed w/ pneumonia.)  Independent visualization of images, tracings, or specimens: yes           DIAGNOSIS  Final diagnoses:   None       DISPOSITION    ADMISSION    Discussed treatment plan and reason for admission with pt/family and admitting physician.  Pt/family  voiced understanding of the plan for admission for further testing/treatment as needed.       Latest Documented Vital Signs:  As of 9:34 PM  BP- 99/50 HR- (!) 124 Temp- (!) 102.5 °F (39.2 °C) (Oral) O2 sat- 93%    --  Documentation assistance provided by sudeep Shaw for Dr. Mahmood.  Information recorded by the scribe was done at my direction and has been verified and validated by me.     Deborah Shaw  05/31/17 2043       Deborah Shaw  05/31/17 2047       Deborah Shaw  05/31/17 9855       Duc Mahmood MD  06/03/17 4339

## 2017-06-01 NOTE — PROGRESS NOTES
"DAILY PROGRESS NOTE  [facility]    Patient Identification:  Name: Padilla Bahena  Age: 22 y.o.  Sex: male  :  1995  MRN: [id number]         Primary Care Physician: No Known Provider    Subjective:  Interval History:Still coughing a lot. No diarrhea    Objective:    Scheduled Meds:  azithromycin 500 mg Intravenous Q24H   ceftriaxone 1 g Intravenous Q24H   cetirizine 10 mg Oral Daily   guaiFENesin 1,200 mg Oral BID   PARoxetine 20 mg Oral QAM     Continuous Infusions:  sodium chloride 125 mL/hr Last Rate: 125 mL/hr (17 1009)       Vital signs in last 24 hours:  Temp:  [97.7 °F (36.5 °C)-102.5 °F (39.2 °C)] 99.6 °F (37.6 °C)  Heart Rate:  [101-135] 102  Resp:  [16-24] 16  BP: ()/(50-92) 131/70    Intake/Output:    Intake/Output Summary (Last 24 hours) at 17 1217  Last data filed at 17 0920   Gross per 24 hour   Intake             5010 ml   Output              650 ml   Net             4360 ml       Exam:  /70 (BP Location: Left arm, Patient Position: Lying)  Pulse 102  Temp 99.6 °F (37.6 °C) (Oral)   Resp 16  Ht 69\" (175.3 cm)  Wt 289 lb 6.4 oz (131 kg)  SpO2 92%  BMI 42.74 kg/m2    General Appearance:    Alert, cooperative, no distress   Head:    Normocephalic, without obvious abnormality, atraumatic   Eyes:       Throat:   Lips, tongue, gums normal   Neck:   Supple, symmetrical, trachea midline, no JVD   Lungs:     rhonchi bilaterally, respirations unlabored   Chest Wall:    No tenderness or deformity    Heart:    Regular rate and rhythm, S1 and S2 normal, no murmur,no  Rub or gallop   Abdomen:     Soft, non-tender, bowel sounds active, no masses, no organomegaly    Extremities:   Extremities normal, atraumatic, no cyanosis or edema   Pulses:      Skin:   Skin is warm and dry,  no rashes or palpable lesions   Neurologic:   no focal deficits noted      [unfilled]  Data Review:    Results from last 7 days  Lab Units 17  0635 17  2030   SODIUM " mmol/L 141 132*   POTASSIUM mmol/L 3.7 3.9   CHLORIDE mmol/L 107 95*   TOTAL CO2 mmol/L 19.3* 19.1*   BUN mg/dL 9 9   CREATININE mg/dL 0.93 1.03   GLUCOSE mg/dL 126* 116*   CALCIUM mg/dL 8.5* 9.6       Results from last 7 days  Lab Units 06/01/17 0635 05/31/17 2030   WBC 10*3/mm3 20.31* 18.64*   HEMOGLOBIN g/dL 13.6* 15.9   HEMATOCRIT % 38.3* 44.8   PLATELETS 10*3/mm3 238 268             No results found for: TROPONINT        Results from last 7 days  Lab Units 06/01/17 0635 05/31/17 2030   ALK PHOS U/L 39 51   BILIRUBIN mg/dL 0.8 1.1   ALT (SGPT) U/L 48* 68*   AST (SGOT) U/L 27 48*             No results found for: POCGLU        Patient Active Problem List   Diagnosis Code   • Pneumonia J18.9   • Sepsis A41.9   • Acidosis E87.2   • Acute diarrhea R19.7   • RLQ abdominal pain R10.31       Assessment:  Principal Problem:    Pneumonia  Active Problems:    Sepsis    Acidosis    Acute diarrhea    RLQ abdominal pain      Plan:  Continue with antibiotics and await cultures.    Eduardo Rodriguez MD  6/1/2017  12:17 PM

## 2017-06-01 NOTE — PLAN OF CARE
Problem: Patient Care Overview (Adult)  Goal: Plan of Care Review  Outcome: Ongoing (interventions implemented as appropriate)    06/01/17 0357   Coping/Psychosocial Response Interventions   Plan Of Care Reviewed With patient;mother   Patient Care Overview   Progress improving   Outcome Evaluation   Outcome Summary/Follow up Plan pt admitted from ED about 0039, mother at bedside, iv antibiotics given as ordered, pt states no pain, no SOA, vitals stable, elevated heart rate, Droplet Isolation per orders, reg diet, SCD on, Q4 vitals, iv fluids @ 125cc/hr, waiting for samples to complete stool and respritory culture,urine sent, various testing done and waiting for final reading       Goal: Adult Individualization and Mutuality  Outcome: Ongoing (interventions implemented as appropriate)  Goal: Discharge Needs Assessment  Outcome: Ongoing (interventions implemented as appropriate)    Problem: Infection, Risk/Actual (Adult)  Goal: Identify Related Risk Factors and Signs and Symptoms  Outcome: Ongoing (interventions implemented as appropriate)  Goal: Infection Prevention/Resolution  Outcome: Ongoing (interventions implemented as appropriate)    Problem: Pneumonia (Adult)  Goal: Signs and Symptoms of Listed Potential Problems Will be Absent or Manageable (Pneumonia)  Outcome: Ongoing (interventions implemented as appropriate)

## 2017-06-01 NOTE — PLAN OF CARE
Problem: Patient Care Overview (Adult)  Goal: Plan of Care Review    06/01/17 1526   Coping/Psychosocial Response Interventions   Plan Of Care Reviewed With patient   Outcome Evaluation   Outcome Summary/Follow up Plan Pt. currently independent with functional mobility and does not require skilled inpt. P.T. Pt. can ambulated with nursing staff or family while here in the hospital. Will sign off.

## 2017-06-01 NOTE — THERAPY DISCHARGE NOTE
Acute Care - Physical Therapy Initial Eval/Discharge  [facility]     Patient Name: Padilla Bahena  : 1995  MRN: [id number]  Today's Date: 2017   Onset of Illness/Injury or Date of Surgery Date: 17  Date of Referral to PT: 17  Referring Physician: Eduardo Ochoa      Admit Date: 2017    Visit Dx:    ICD-10-CM ICD-9-CM   1. Pneumonia of both lungs due to infectious organism, unspecified part of lung J18.9 483.8   2. Sepsis, due to unspecified organism A41.9 038.9     995.91     Patient Active Problem List   Diagnosis   • Pneumonia   • Sepsis   • Acidosis   • Acute diarrhea   • RLQ abdominal pain     Past Medical History:   Diagnosis Date   • Anxiety    • Bronchitis, acute    • Environmental and seasonal allergies    • Gingivitis    • Pneumonia      Past Surgical History:   Procedure Laterality Date   • TONSILLECTOMY            PT ASSESSMENT (last 72 hours)      PT Evaluation       17 1524 17 0357    Rehab Evaluation    Document Type discharge evaluation/summary  -MS     Subjective Information agree to therapy  -MS     Patient Effort, Rehab Treatment excellent  -MS     Symptoms Noted Comment Pt. reports feeling fatigued. Otherwise agreeable to ambulate with P.T.  -MS     General Information    Onset of Illness/Injury or Date of Surgery Date 17  -MS     Referring Physician Eduardo Ochoa  -MS     Pertinent History Of Current Problem Pt. admitted with PNA  -MS     Precautions/Limitations --   C. Diff/ Droplet Isolation  -MS     Prior Level of Function independent:  -MS     Equipment Currently Used at Home none  -MS none  -TC    Plans/Goals Discussed With patient and family;agreed upon  -MS     Risks Reviewed patient and family:  -MS     Benefits Reviewed patient and family:  -MS     Barriers to Rehab none identified  -MS     Living Environment    Transportation Available  car;family or friend will provide  -TC    Clinical Impression    Date of Referral to PT 17   -MS     Criteria for Skilled Therapeutic Interventions Met no problems identified which require skilled intervention  -MS     Pain Assessment    Pain Assessment No/denies pain  -MS     Cognitive Assessment/Intervention    Current Cognitive/Communication Assessment functional  -MS     Orientation Status oriented x 4  -MS     Follows Commands/Answers Questions 100% of the time  -MS     Personal Safety WNL/WFL  -MS     Personal Safety Interventions fall prevention program maintained;gait belt;nonskid shoes/slippers when out of bed;supervised activity  -MS     ROM (Range of Motion)    General ROM no range of motion deficits identified  -MS     MMT (Manual Muscle Testing)    General MMT Assessment --   BUE/LE MMT (4+/5)  -MS     Bed Mobility, Assessment/Treatment    Bed Mob, Supine to Sit, [place] independent  -MS     Bed Mob, Sit to Supine, [place] independent  -MS     Transfer Assessment/Treatment    Transfers, Sit-Stand [place] independent  -MS     Transfers, Stand-Sit [place] independent  -MS     Gait Assessment/Treatment    Gait, [place]cock Level independent  -MS     Gait, Distance (Feet) 250  -MS     Gait, Comment No balance or gait deficits noted.  -MS     Positioning and Restraints    Pre-Treatment Position in bed  -MS     Post Treatment Position bed  -MS     In Bed notified nsg;supine;call light within reach;encouraged to call for assist;with family/caregiver   All lines intact.  -MS       06/01/17 0056 06/01/17 0055    General Information    Equipment Currently Used at Home  none  -DA    Living Environment    Lives With parent(s)  -DA     Living Arrangements house  -DA     Home Accessibility no concerns  -DA     Stair Railings at Home none  -DA     Type of Financial/Environmental Concern none  -DA     Transportation Available car  -DA       User Key  (r) = Recorded By, (t) = Taken By, (c) = Cosigned By    Initials Name Provider Type    DA Lexi Ware, RN Registered Nurse    MS Lim  BECKY Wright, PT Physical Therapist    AMERICA Pollock, RN Registered Nurse          Physical Therapy Education     Title: PT OT SLP Therapies (Resolved)     Topic: Physical Therapy (Resolved)     Point: Mobility training (Resolved)    Learning Progress Summary    Learner Readiness Method Response Comment Documented by Status   Patient Acceptance E,MILKA JOSE M,NR  MS 06/01/17 1526 Done               Point: Body mechanics (Resolved)    Learning Progress Summary    Learner Readiness Method Response Comment Documented by Status   Patient Acceptance E,D JOSE M,NR  MS 06/01/17 1526 Done               Point: Precautions (Resolved)    Learning Progress Summary    Learner Readiness Method Response Comment Documented by Status   Patient Acceptance E,D JOSE M,NR  MS 06/01/17 1526 Done                      User Key     Initials Effective Dates Name Provider Type Discipline    MS 12/01/15 -  Raphael Wright, PT Physical Therapist PT                PT Recommendation and Plan  Anticipated Discharge Disposition: home  Plan of Care Review  Plan Of Care Reviewed With: patient  Outcome Summary/Follow up Plan: Pt. currently independent with functional mobility and does not require skilled inpt. P.T.  Pt. can ambulated with nursing staff or family while here in the hospital.  Will sign off.              Outcome Measures       06/01/17 1500          How much help from another person do you currently need...    Turning from your back to your side while in flat bed without using bedrails? 4  -MS      Moving from lying on back to sitting on the side of a flat bed without bedrails? 4  -MS      Moving to and from a bed to a chair (including a wheelchair)? 4  -MS      Standing up from a chair using your arms (e.g., wheelchair, bedside chair)? 4  -MS      Climbing 3-5 steps with a railing? 4  -MS      To walk in hospital room? 4  -MS      AM-PAC 6 Clicks Score 24  -MS      Functional Assessment    Outcome Measure Options AM-PAC 6 Clicks Basic Mobility (PT)  -MS         User Key  (r) = Recorded By, (t) = Taken By, (c) = Cosigned By    Initials Name Provider Type    MS Raphael L Adriana PT Physical Therapist           Time Calculation:         PT Charges       06/01/17 1529          Time Calculation    Start Time 1510  -MS      Stop Time 1523  -MS      Time Calculation (min) 13 min  -MS      PT Received On 06/01/17  -MS        User Key  (r) = Recorded By, (t) = Taken By, (c) = Cosigned By    Initials Name Provider Type    MS Raphael L Adriana, PT Physical Therapist          Therapy Charges for Today     Code Description Service Date Service Provider Modifiers Qty    42131575934 HC PT MOBILITY CURRENT 6/1/2017 Raphael Wright, PT GP, CH 1    85063017661 HC PT MOBILITY PROJECTED 6/1/2017 Raphael Wright, PT GP, [place] 1    03723307508 HC PT MOBILITY DISCHARGE 6/1/2017 Raphael Wright, PT GP, [place] 1    78628536873 HC PT EVAL LOW COMPLEXITY 1 6/1/2017 Raphael Wright, PT GP 1          PT G-Codes  Outcome Measure Options: AM-PAC 6 Clicks Basic Mobility (PT)  Functional Limitation: Mobility: Walking and moving around  Mobility: Walking and Moving Around Current Status (): 0 percent impaired, limited or restricted  Mobility: Walking and Moving Around Goal Status (): 0 percent impaired, limited or restricted  Mobility: Walking and Moving Around Discharge Status (): 0 percent impaired, limited or restricted    PT Discharge Summary  Anticipated Discharge Disposition: home    Raphael Wright PT  6/1/2017

## 2017-06-01 NOTE — PLAN OF CARE
Problem: Patient Care Overview (Adult)  Goal: Plan of Care Review  Outcome: Ongoing (interventions implemented as appropriate)    06/01/17 6474   Coping/Psychosocial Response Interventions   Plan Of Care Reviewed With patient   Patient Care Overview   Progress no change   Outcome Evaluation   Outcome Summary/Follow up Plan VSS. WBC elevated from yesterday. Continue IVF and IV abx. No c/o pain. Up ad alyssa. Contact spore isolation for rule out c-diff. Need specimen. Droplet precautions for PNA. Continue to monitor patient.       Goal: Adult Individualization and Mutuality  Outcome: Ongoing (interventions implemented as appropriate)  Goal: Discharge Needs Assessment  Outcome: Ongoing (interventions implemented as appropriate)    Problem: Infection, Risk/Actual (Adult)  Goal: Identify Related Risk Factors and Signs and Symptoms  Outcome: Outcome(s) achieved Date Met:  06/01/17  Goal: Infection Prevention/Resolution  Outcome: Ongoing (interventions implemented as appropriate)    Problem: Pneumonia (Adult)  Goal: Signs and Symptoms of Listed Potential Problems Will be Absent or Manageable (Pneumonia)  Outcome: Ongoing (interventions implemented as appropriate)

## 2017-06-01 NOTE — H&P
Name: Padilla Bahena ADMIT: 2017   : 1995  PCP: No Known Provider    MRN: [id number] LOS: 0 days   AGE/SEX: 22 y.o. male  ROOM:      Chief Complaint   Patient presents with   • Cough   • Fever       Subjective   Patient is a 22 y.o. male who presents to [facility] with the above chief complaint.      The patient is a 22-year-old male who presented to the emergency room initially on May 19, 2017 for productive cough and with green sputum.  He was diagnosed with pneumonia and given doxycycline.  He completed treatment.  He presented back to the emergency room today for worsening symptoms and failure of outpatient therapy of pneumonia.  He has worsening shortness of breath, fever and cough.  Initially symptoms improved after taking the doxycycline with actually began to worsen after finishing them.  He was found to have elevated lactic acid.  White blood count is 18.6 up from 9.9 12 days ago.  His liver function is also elevated.  Today he had a fever at home 101-2.5 the emergency room here.  Feels weak, tired, fatigued, and chest pain on coughing.  Surprisingly chest x-ray was negative for pneumonia but CT of his chest has been ordered.  He is also had some headache and CT head was obtained which was negative.  He also has had some diarrhea.     History of Present Illness    Past Medical History:   Diagnosis Date   • Anxiety    • Bronchitis, acute    • Environmental and seasonal allergies    • Gingivitis    • Pneumonia      Past Surgical History:   Procedure Laterality Date   • TONSILLECTOMY       History reviewed. No pertinent family history.  Social History   Substance Use Topics   • Smoking status: Never Smoker   • Smokeless tobacco: Never Used   • Alcohol use No       (Not in a hospital admission)  Allergies:  Dust mite extract and Pollen extract    Review of Systems   Constitutional: Positive for activity change, appetite change, fatigue and fever. Negative for unexpected  weight change.   HENT: Positive for sore throat. Negative for trouble swallowing.    Eyes: Negative for pain and visual disturbance.   Respiratory: Positive for cough, chest tightness and shortness of breath. Negative for wheezing.    Cardiovascular: Positive for chest pain (Pleuritic). Negative for palpitations and leg swelling.   Gastrointestinal: Positive for diarrhea and nausea. Negative for abdominal pain, constipation and vomiting.   Endocrine:        Negative for Diabetes or thyroid disease   Genitourinary: Negative for difficulty urinating and hematuria.   Musculoskeletal: Negative.  Negative for back pain, neck pain and neck stiffness.   Skin: Positive for pallor. Negative for rash and wound.   Neurological: Positive for weakness and headaches. Negative for dizziness, syncope and light-headedness.   Hematological: Negative for adenopathy. Does not bruise/bleed easily.   Psychiatric/Behavioral: Negative for agitation, behavioral problems and confusion.        Objective    Vital Signs  Temp:  [99.2 °F (37.3 °C)-102.5 °F (39.2 °C)] 99.2 °F (37.3 °C)  Heart Rate:  [124-135] 130  Resp:  [18-24] 18  BP: ()/(50-92) 109/58  SpO2:  [91 %-94 %] 94 %  on   ;   O2 Device: room air  Body mass index is 41.35 kg/(m^2).    Physical Exam   Constitutional: He is oriented to person, place, and time. He appears well-developed and well-nourished. He appears distressed.   HENT:   Head: Normocephalic and atraumatic.   Mouth/Throat: Oropharynx is clear and moist. No oropharyngeal exudate.   Eyes: Conjunctivae and EOM are normal. Pupils are equal, round, and reactive to light. No scleral icterus.   Neck: Normal range of motion. Neck supple. No JVD present. No thyromegaly present.   Cardiovascular: Normal rate, regular rhythm and normal heart sounds.  Exam reveals no gallop and no friction rub.    No murmur heard.  Pulmonary/Chest: Effort normal. No stridor. No respiratory distress. He has rales (Right lower lobe). He exhibits  no tenderness.   Abdominal: Soft. Bowel sounds are normal. He exhibits no distension. There is tenderness (Mild right lower quadrant to deep palpation). There is no rebound and no guarding.   Musculoskeletal: He exhibits no edema or tenderness.   Lymphadenopathy:     He has no cervical adenopathy.   Neurological: He is alert and oriented to person, place, and time. No cranial nerve deficit.   Skin: Skin is warm. He is diaphoretic. There is pallor.   Psychiatric: He has a normal mood and affect. His speech is normal and behavior is normal.   He is nervous and anxious       Results Review:   I reviewed the patient's new clinical results. appears to have a right hilar infiltrate     Results from last 7 days  Lab Units 05/31/17 2030   WBC 10*3/mm3 18.64*   HEMOGLOBIN g/dL 15.9   PLATELETS 10*3/mm3 268       Results from last 7 days  Lab Units 05/31/17 2030   SODIUM mmol/L 132*   POTASSIUM mmol/L 3.9   CHLORIDE mmol/L 95*   TOTAL CO2 mmol/L 19.1*   BUN mg/dL 9   CREATININE mg/dL 1.03   GLUCOSE mg/dL 116*   ALBUMIN g/dL 4.60   BILIRUBIN mg/dL 1.1   ALK PHOS U/L 51   AST (SGOT) U/L 48*   ALT (SGPT) U/L 68*   Estimated Creatinine Clearance: 150.8 mL/min (by C-G formula based on Cr of 1.03).        CT Head Without Contrast   Preliminary Result   No definite acute intracranial pathology.                      XR Chest 2 View   Preliminary Result   No acute findings.              CT Chest Without Contrast    (Results Pending)   CT Abdomen Pelvis With & Without Contrast    (Results Pending)     Assessment/Plan     Principal Problem:    Pneumonia  Active Problems:    Sepsis    Acidosis    Acute diarrhea    RLQ abdominal pain      Assessment & Plan    Patient has been admitted to our service for sepsis presumed to be from pneumonia and failure of outpatient therapy due to worsening cough, shortness of breath and chest pain with associated fever.  He has elevated white count of over 18,000, fever of 102.5 and tachycardia.  He's  been given vancomycin and Zosyn in the emergency room.  I'm going to change the antibiotics to cover for community-acquired pneumonia with ceftriaxone and azithromycin as well as check respiratory viral panel, legionella and strep pneumo antigen as well as respiratory culture.  Interestingly, chest x-ray was negative for pneumonia today but + for pneumonia 12 days ago.  CT chest has been ordered.  He has right lower quadrant pain and some diarrhea so I will check a C. difficile.  As there is no definitive pneumonia on the most recent chest x-ray also going to get a CT of his abdomen to look for possibility of appendicitis.  Continue IV fluids and trend lactic acids area further care and management based on patient's condition and as his clinical course unfolds.  Of note, he was discharged 12 days ago with prednisone 50 mg which could be contributing to his leukocytosis    I discussed and answered all questions from the patient's mother and the patient.    I discussed the patients findings and my recommendations with patient and family.          Geovanny De Oliveira MD  [facility]  05/31/17  10:58 PM

## 2017-06-02 VITALS
SYSTOLIC BLOOD PRESSURE: 156 MMHG | RESPIRATION RATE: 18 BRPM | TEMPERATURE: 98.4 F | BODY MASS INDEX: 42.86 KG/M2 | HEIGHT: 69 IN | DIASTOLIC BLOOD PRESSURE: 108 MMHG | OXYGEN SATURATION: 96 % | WEIGHT: 289.4 LBS | HEART RATE: 81 BPM

## 2017-06-02 LAB
ANION GAP SERPL CALCULATED.3IONS-SCNC: 13.7 MMOL/L
BASOPHILS # BLD AUTO: 0.03 10*3/MM3 (ref 0–0.2)
BASOPHILS NFR BLD AUTO: 0.3 % (ref 0–1.5)
BUN BLD-MCNC: 6 MG/DL (ref 6–20)
BUN/CREAT SERPL: 6.7 (ref 7–25)
CALCIUM SPEC-SCNC: 8.8 MG/DL (ref 8.6–10.5)
CHLORIDE SERPL-SCNC: 104 MMOL/L (ref 98–107)
CO2 SERPL-SCNC: 19.3 MMOL/L (ref 22–29)
CREAT BLD-MCNC: 0.89 MG/DL (ref 0.76–1.27)
DEPRECATED RDW RBC AUTO: 40.5 FL (ref 37–54)
EOSINOPHIL # BLD AUTO: 0.33 10*3/MM3 (ref 0–0.7)
EOSINOPHIL NFR BLD AUTO: 3.3 % (ref 0.3–6.2)
ERYTHROCYTE [DISTWIDTH] IN BLOOD BY AUTOMATED COUNT: 13.4 % (ref 11.5–14.5)
GFR SERPL CREATININE-BSD FRML MDRD: 109 ML/MIN/1.73
GLUCOSE BLD-MCNC: 99 MG/DL (ref 65–99)
HCT VFR BLD AUTO: 39.6 % (ref 40.4–52.2)
HGB BLD-MCNC: 13.4 G/DL (ref 13.7–17.6)
IMM GRANULOCYTES # BLD: 0 10*3/MM3 (ref 0–0.03)
IMM GRANULOCYTES NFR BLD: 0 % (ref 0–0.5)
LYMPHOCYTES # BLD AUTO: 2.6 10*3/MM3 (ref 0.9–4.8)
LYMPHOCYTES NFR BLD AUTO: 25.6 % (ref 19.6–45.3)
MCH RBC QN AUTO: 28.3 PG (ref 27–32.7)
MCHC RBC AUTO-ENTMCNC: 33.8 G/DL (ref 32.6–36.4)
MCV RBC AUTO: 83.5 FL (ref 79.8–96.2)
MONOCYTES # BLD AUTO: 0.72 10*3/MM3 (ref 0.2–1.2)
MONOCYTES NFR BLD AUTO: 7.1 % (ref 5–12)
NEUTROPHILS # BLD AUTO: 6.47 10*3/MM3 (ref 1.9–8.1)
NEUTROPHILS NFR BLD AUTO: 63.7 % (ref 42.7–76)
PLATELET # BLD AUTO: 238 10*3/MM3 (ref 140–500)
PMV BLD AUTO: 9.8 FL (ref 6–12)
POTASSIUM BLD-SCNC: 3.6 MMOL/L (ref 3.5–5.2)
RBC # BLD AUTO: 4.74 10*6/MM3 (ref 4.6–6)
SODIUM BLD-SCNC: 137 MMOL/L (ref 136–145)
WBC NRBC COR # BLD: 10.15 10*3/MM3 (ref 4.5–10.7)

## 2017-06-02 PROCEDURE — 85025 COMPLETE CBC W/AUTO DIFF WBC: CPT | Performed by: INTERNAL MEDICINE

## 2017-06-02 PROCEDURE — 94799 UNLISTED PULMONARY SVC/PX: CPT

## 2017-06-02 PROCEDURE — 80048 BASIC METABOLIC PNL TOTAL CA: CPT | Performed by: HOSPITALIST

## 2017-06-02 RX ORDER — CEPHALEXIN 500 MG/1
500 CAPSULE ORAL 4 TIMES DAILY
Qty: 28 CAPSULE | Refills: 0 | Status: SHIPPED | OUTPATIENT
Start: 2017-06-02 | End: 2018-09-16

## 2017-06-02 RX ORDER — GUAIFENESIN/DEXTROMETHORPHAN 100-10MG/5
10 SYRUP ORAL EVERY 4 HOURS PRN
Status: DISCONTINUED | OUTPATIENT
Start: 2017-06-02 | End: 2017-06-02 | Stop reason: HOSPADM

## 2017-06-02 RX ORDER — AZITHROMYCIN 250 MG/1
TABLET, FILM COATED ORAL
Qty: 6 TABLET | Refills: 0 | Status: SHIPPED | OUTPATIENT
Start: 2017-06-02 | End: 2018-09-16

## 2017-06-02 RX ADMIN — CETIRIZINE HYDROCHLORIDE 10 MG: 10 TABLET, FILM COATED ORAL at 11:10

## 2017-06-02 RX ADMIN — SODIUM CHLORIDE 125 ML/HR: 9 INJECTION, SOLUTION INTRAVENOUS at 03:29

## 2017-06-02 RX ADMIN — PAROXETINE HYDROCHLORIDE HEMIHYDRATE 20 MG: 20 TABLET, FILM COATED ORAL at 06:16

## 2017-06-02 RX ADMIN — GUAIFENESIN AND DEXTROMETHORPHAN 10 ML: 100; 10 SYRUP ORAL at 06:16

## 2017-06-02 RX ADMIN — GUAIFENESIN AND DEXTROMETHORPHAN 10 ML: 100; 10 SYRUP ORAL at 11:10

## 2017-06-02 RX ADMIN — IPRATROPIUM BROMIDE AND ALBUTEROL SULFATE 3 ML: .5; 3 SOLUTION RESPIRATORY (INHALATION) at 07:32

## 2017-06-02 NOTE — PROGRESS NOTES
Continued Stay Note  [facility]     Patient Name: Padilla Bahena  MRN: [id number]  Today's Date: 6/2/2017    Admit Date: 5/31/2017          Discharge Plan       06/02/17 1614    Case Management/Social Work Plan    Plan Return home with mother    Patient/Family In Agreement With Plan yes    Final Note    Final Note Patient has been DC'd home              Discharge Codes       06/02/17 1614    Discharge Codes    Discharge Codes 01  Discharge to home        Expected Discharge Date and Time     Expected Discharge Date Expected Discharge Time    Jun 2, 2017             Becky S. [place], RN

## 2017-06-02 NOTE — DISCHARGE SUMMARY
PHYSICIAN DISCHARGE SUMMARY                                                                        [facility]    Patient Identification:  Name: Padilla Bahena  Age: 22 y.o.  Sex: male  :  1995  MRN: [id number]  Primary Care Physician: No Known Provider    Admit date: 2017  Discharge date and time:2017  Discharged Condition: good    Discharge Diagnoses:Principal Problem:    Pneumonia  Active Problems:    Sepsis    Acidosis    Acute diarrhea    RLQ abdominal pain     Patient Active Problem List   Diagnosis Code   • Pneumonia J18.9   • Sepsis A41.9   • Acidosis E87.2   • Acute diarrhea R19.7   • RLQ abdominal pain R10.31       PMHX:   Past Medical History:   Diagnosis Date   • Anxiety    • Bronchitis, acute    • Environmental and seasonal allergies    • Gingivitis    • Pneumonia      PSHX:   Past Surgical History:   Procedure Laterality Date   • TONSILLECTOMY         Hospital Course: Padilla Bahena  is a 22-year-old male who presented to the emergency room initially on May 19, 2017 for productive cough and with green sputum.  He was diagnosed with pneumonia and given doxycycline.  He completed treatment.  He presented back to the emergency room today for worsening symptoms and failure of outpatient therapy of pneumonia.  He has worsening shortness of breath, fever and cough.  Initially symptoms improved after taking the doxycycline with actually began to worsen after finishing them.  He was found to have elevated lactic acid.  White blood count is 18.6 up from 9.9 12 days ago.  His liver function is also elevated.  Today he had a fever at home 101-2.5 the emergency room here.  Feels weak, tired, fatigued, and chest pain on coughing.  Surprisingly chest x-ray was negative for pneumonia but CT of his chest has been ordered.  He is also had some headache and CT head was obtained which was negative.  He also has had some  diarrhea.           The patient was admitted with pneumonia which failed to respond to outpatient treatment.  His temperature came down his white count was better after few days of IV antibiotics with Rocephin and Zithromax.  We'll continue with another week of oral antibiotics with Keflex and Zithromax and follow up with his primary care doctor in 1 week for continuing care.    Consults:     Consults     Date and Time Order Name Status Description    5/31/2017 6730 [facility] (on-call MD unless specified) Completed           Results from last 7 days  Lab Units 06/02/17  0701   WBC 10*3/mm3 10.15   HEMOGLOBIN g/dL 13.4*   HEMATOCRIT % 39.6*   PLATELETS 10*3/mm3 238       Results from last 7 days  Lab Units 06/02/17  0701   SODIUM mmol/L 137   POTASSIUM mmol/L 3.6   CHLORIDE mmol/L 104   TOTAL CO2 mmol/L 19.3*   BUN mg/dL 6   CREATININE mg/dL 0.89   GLUCOSE mg/dL 99   CALCIUM mg/dL 8.8     Significant Diagnostic Studies:   Lab Results   Component Value Date    WBC 10.15 06/02/2017    HGB 13.4 (L) 06/02/2017    HCT 39.6 (L) 06/02/2017     06/02/2017     Lab Results   Component Value Date     06/02/2017    K 3.6 06/02/2017     06/02/2017    CO2 19.3 (L) 06/02/2017    BUN 6 06/02/2017    CREATININE 0.89 06/02/2017    GLUCOSE 99 06/02/2017     Lab Results   Component Value Date    CALCIUM 8.8 06/02/2017     Lab Results   Component Value Date    AST 27 06/01/2017    ALT 48 (H) 06/01/2017    ALKPHOS 39 06/01/2017     No results found for: APTT, INR  No results found for: COLORU, CLARITYU, SPECGRAV, PHUR, PROTEINUR, GLUCOSEU, KETONESU, BLOODU, NITRITE, LEUKOCYTESUR, BILIRUBINUR, UROBILINOGEN, RBCUA, WBCUA, BACTERIA  No results found for: TROPONINT, TROPONINI, BNP  No components found for: HGBA1C;2  No components found for: TSH;2  Imaging Results (all)     Procedure Component Value Units Date/Time    XR Chest 2 View [[id number]] Collected:  05/31/17 2125     Updated:  06/02/17 0023    Narrative:       CHEST PA AND  LATERAL.     HISTORY: Shortness of breath.     COMPARISON: 05/19/2017.     FINDINGS:  Cardiomediastinal silhouette is within normal limits. Lung volumes are  low.     There is no consolidation or effusion. Interval resolution of infiltrate  in the right lung base.          Impression:       No acute findings.         This report was finalized on 6/2/2017 12:20 AM by Dr. Shanon Collier MD.       CT Head Without Contrast [[id number]] Collected:  05/31/17 2218     Updated:  06/02/17 0036    Narrative:       CT SCAN OF THE BRAIN WITHOUT CONTRAST.     TECHNIQUE: Multiple axial images of the brain were obtained from the  vertex to the base of the brain.     HISTORY:  Fever, headache.     COMPARISON:  No prior studies for comparison.     FINDINGS:   Midline structures are within normal limits, there is no hydrocephalus.  Gray-white matter differentiation is maintained.         Orbits are within normal limits. No significant mucosal disease of the  para-nasal sinuses. Mastoid air cells are well aerated.             Impression:       No definite acute intracranial pathology.         This report was finalized on 6/2/2017 12:33 AM by Dr. Shanon Collier MD.       CT Chest With Contrast [[id number]] Collected:  05/31/17 2341     Updated:  06/02/17 0049    Narrative:       CT SCAN OF THE CHEST WITH CONTRAST.     TECHNIQUE: Routine axial images of the chest were obtained with  reconstructed images.     HISTORY:  Pneumonia.     COMPARISON:  No prior studies for comparison.     FINDINGS:   No pericardial effusion. There are a few mediastinal lymph nodes,  largest in the AP window measures 1.9 cm.         No consolidation or effusion. Mild bibasilar atelectasis.       Impression:       1.  No evidence for pneumonia, mild bibasilar atelectasis.  2.  Mediastinal lymphadenopathy, an AP window lymph node measures 1.9  cm.     ----------------------------------------------------------------     CT ABDOMEN AND PELVIS    .     TECHNIQUE: A  routine CT scan of the abdomen and pelvis was performed  with coronal and sagittal reconstructed images.     HISTORY: Pneumonia, right lower quadrant pain.     COMPARISON: No prior studies for comparison.     FINDINGS:  Liver demonstrates homogeneous parenchyma, no definite mass. Fatty  infiltration of the liver. Unremarkable gallbladder. No intra or  extrahepatic biliary ductal dilatation.      The spleen is unremarkable. Pancreas is unremarkable, no pancreatic  ductal dilatation. Adrenal glands are within normal limits.     Kidneys do not demonstrate hydronephrosis. No definite renal calculi.  Urinary bladder is unremarkable.         Small and large bowel loops are within normal limits. Appendix is  normal.     No significant retroperitoneal lymphadenopathy.            IMPRESSION:  No definite acute abdominal pathology, no obstructive uropathy or  inflammatory bowel disease. Appendix is normal.  Fatty infiltration of the liver.     This report was finalized on 6/2/2017 12:46 AM by Dr. Shanon Collier MD.       CT Abdomen Pelvis With Contrast [[id number]] Collected:  05/31/17 2341     Updated:  06/02/17 0049    Narrative:       CT SCAN OF THE CHEST WITH CONTRAST.     TECHNIQUE: Routine axial images of the chest were obtained with  reconstructed images.     HISTORY:  Pneumonia.     COMPARISON:  No prior studies for comparison.     FINDINGS:   No pericardial effusion. There are a few mediastinal lymph nodes,  largest in the AP window measures 1.9 cm.         No consolidation or effusion. Mild bibasilar atelectasis.       Impression:       1.  No evidence for pneumonia, mild bibasilar atelectasis.  2.  Mediastinal lymphadenopathy, an AP window lymph node measures 1.9  cm.     ----------------------------------------------------------------     CT ABDOMEN AND PELVIS    .     TECHNIQUE: A routine CT scan of the abdomen and pelvis was performed  with coronal and sagittal reconstructed images.     HISTORY: Pneumonia, right  lower quadrant pain.     COMPARISON: No prior studies for comparison.     FINDINGS:  Liver demonstrates homogeneous parenchyma, no definite mass. Fatty  infiltration of the liver. Unremarkable gallbladder. No intra or  extrahepatic biliary ductal dilatation.      The spleen is unremarkable. Pancreas is unremarkable, no pancreatic  ductal dilatation. Adrenal glands are within normal limits.     Kidneys do not demonstrate hydronephrosis. No definite renal calculi.  Urinary bladder is unremarkable.         Small and large bowel loops are within normal limits. Appendix is  normal.     No significant retroperitoneal lymphadenopathy.            IMPRESSION:  No definite acute abdominal pathology, no obstructive uropathy or  inflammatory bowel disease. Appendix is normal.  Fatty infiltration of the liver.     This report was finalized on 6/2/2017 12:46 AM by Dr. Shanon Collier MD.           Lab Results (last 7 days)     Procedure Component Value Units Date/Time    CBC & Differential [[id number]] Collected:  05/31/17 2030    Specimen:  Blood Updated:  05/31/17 2100    Narrative:       The following orders were created for panel order CBC & Differential.  Procedure                               Abnormality         Status                     ---------                               -----------         ------                     Scan Slide[[id number]]                    Normal              Final result               CBC Auto Differential[[id number]]         Abnormal            Final result                 Please view results for these tests on the individual orders.    CBC Auto Differential [[id number]]  (Abnormal) Collected:  05/31/17 2030    Specimen:  Blood Updated:  05/31/17 2100     WBC 18.64 (H) 10*3/mm3      RBC 5.40 10*6/mm3      Hemoglobin 15.9 g/dL      Hematocrit 44.8 %      MCV 83.0 fL      MCH 29.4 pg      MCHC 35.5 g/dL      RDW 13.2 %      RDW-SD 39.3 fl      MPV 9.8 fL      Platelets 268 10*3/mm3      Neutrophil % 92.0  (H) %      Lymphocyte % 4.4 (L) %      Monocyte % 3.1 (L) %      Eosinophil % 0.1 (L) %      Basophil % 0.1 %      Immature Grans % 0.3 %      Neutrophils, Absolute 17.16 (H) 10*3/mm3      Lymphocytes, Absolute 0.82 (L) 10*3/mm3      Monocytes, Absolute 0.57 10*3/mm3      Eosinophils, Absolute 0.01 10*3/mm3      Basophils, Absolute 0.02 10*3/mm3      Immature Grans, Absolute 0.06 (H) 10*3/mm3     Scan Slide [[id number]]  (Normal) Collected:  05/31/17 2030    Specimen:  Blood Updated:  05/31/17 2100     RBC Morphology Normal     WBC Morphology Normal     Platelet Morphology Normal    Comprehensive Metabolic Panel [[id number]]  (Abnormal) Collected:  05/31/17 2030    Specimen:  Blood Updated:  05/31/17 2105     Glucose 116 (H) mg/dL      BUN 9 mg/dL      Creatinine 1.03 mg/dL      Sodium 132 (L) mmol/L      Potassium 3.9 mmol/L      Chloride 95 (L) mmol/L      CO2 19.1 (L) mmol/L      Calcium 9.6 mg/dL      Total Protein 8.5 g/dL      Albumin 4.60 g/dL      ALT (SGPT) 68 (H) U/L      AST (SGOT) 48 (H) U/L      Alkaline Phosphatase 51 U/L      Total Bilirubin 1.1 mg/dL      eGFR Non African Amer 92 mL/min/1.73      Globulin 3.9 gm/dL      A/G Ratio 1.2 g/dL      BUN/Creatinine Ratio 8.7     Anion Gap 17.9 mmol/L     Lactic Acid, Plasma [[id number]]  (Abnormal) Collected:  05/31/17 2030    Specimen:  Blood Updated:  05/31/17 2112     Lactate 3.2 (C) mmol/L     Procalcitonin [[id number]]  (Abnormal) Collected:  05/31/17 2030    Specimen:  Blood Updated:  05/31/17 2113     Procalcitonin 2.51 (C) ng/mL     Narrative:       As a Marker for Sepsis (Non-Neonates):   1. <0.5 ng/mL represents a low risk of severe sepsis and/or septic shock.  1. >2 ng/mL represents a high risk of severe sepsis and/or septic shock.    As a Marker for Lower Respiratory Tract Infections that require antibiotic therapy:  PCT on Admission     Antibiotic Therapy             6-12 Hrs later  > 0.5                Strongly Recommended            >0.25 - <0.5       "   Recommended  0.1 - 0.25           Discouraged                   Remeasure/reassess PCT  <0.1                 Strongly Discouraged          Remeasure/reassess PCT      As 28 day mortality risk marker: \"Change in Procalcitonin Result\" (> 80 % or <=80 %) if Day 0 (or Day 1) and Day 4 values are available. Refer to [email or web address]/   Change in PCT <=80 %   A decrease of PCT levels below or equal to 80 % defines a positive change in PCT test result representing a higher risk for 28-day all-cause mortality of patients diagnosed with severe sepsis or septic shock.  Change in PCT > 80 %   A decrease of PCT levels of more than 80 % defines a negative change in PCT result representing a lower risk for 28-day all-cause mortality of patients diagnosed with severe sepsis or septic shock.                Respiratory Panel, PCR [[id number]]  (Normal) Collected:  05/31/17 2040    Specimen:  Swab from Nares Updated:  05/31/17 2346     ADENOVIRUS, PCR Not Detected     Coronavirus 229E Not Detected     Coronavirus HKU1 Not Detected     Coronavirus NL63 Not Detected     Coronavirus OC43 Not Detected     Human Metapneumovirus Not Detected     Human Rhinovirus/Enterovirus Not Detected     Influenza B PCR Not Detected     Parainfluenza Virus 1 Not Detected     Parainfluenza Virus 2 Not Detected     Parainfluenza Virus 3 Not Detected     Parainfluenza Virus 4 Not Detected     Bordetella pertussis pcr Not Detected     Influenza 2009 H1N1 by PCR Not Detected     Chlamydophila pneumoniae PCR Not Detected     Mycoplasma pneumo by PCR Not Detected     Influenza A PCR Not Detected     Influenza A H3 Not Detected     Influenza A H1 Not Detected     RSV, PCR Not Detected    Lactate Acid, Reflex [[id number]]  (Abnormal) Collected:  06/01/17 0118    Specimen:  Blood Updated:  06/01/17 0146     Lactate 2.8 (C) mmol/L     S. Pneumo Ag Urine or CSF [[id number]]  (Normal) Collected:  06/01/17 0146    Specimen:  Urine from Urine, Clean " Catch Updated:  06/01/17 0225     Strep Pneumo Ag Negative    Legionella Antigen, Urine [[id number]]  (Normal) Collected:  06/01/17 0146    Specimen:  Urine from Urine, Clean Catch Updated:  06/01/17 0225     LEGIONELLA ANTIGEN, URINE Negative    Comprehensive Metabolic Panel [[id number]]  (Abnormal) Collected:  06/01/17 0635    Specimen:  Blood Updated:  06/01/17 0716     Glucose 126 (H) mg/dL      BUN 9 mg/dL      Creatinine 0.93 mg/dL      Sodium 141 mmol/L      Potassium 3.7 mmol/L      Chloride 107 mmol/L      CO2 19.3 (L) mmol/L      Calcium 8.5 (L) mg/dL      Total Protein 6.9 g/dL      Albumin 3.50 g/dL      ALT (SGPT) 48 (H) U/L      AST (SGOT) 27 U/L      Alkaline Phosphatase 39 U/L      Total Bilirubin 0.8 mg/dL      eGFR Non African Amer 104 mL/min/1.73      Globulin 3.4 gm/dL      A/G Ratio 1.0 g/dL      BUN/Creatinine Ratio 9.7     Anion Gap 14.7 mmol/L     CBC & Differential [[id number]] Collected:  06/01/17 0635    Specimen:  Blood Updated:  06/01/17 0722    Narrative:       The following orders were created for panel order CBC & Differential.  Procedure                               Abnormality         Status                     ---------                               -----------         ------                     Scan Slide[[id number]]                                                                  CBC Auto Differential[[id number]]        Abnormal            Final result                 Please view results for these tests on the individual orders.    CBC Auto Differential [[id number]]  (Abnormal) Collected:  06/01/17 0635    Specimen:  Blood Updated:  06/01/17 0722     WBC 20.31 (H) 10*3/mm3      RBC 4.64 10*6/mm3      Hemoglobin 13.6 (L) g/dL      Hematocrit 38.3 (L) %      MCV 82.5 fL      MCH 29.3 pg      MCHC 35.5 g/dL      RDW 13.4 %      RDW-SD 40.0 fl      MPV 9.7 fL      Platelets 238 10*3/mm3      Neutrophil % 81.5 (H) %      Lymphocyte % 14.4 (L) %      Monocyte % 3.4 (L) %      Eosinophil %  0.2 (L) %      Basophil % 0.1 %      Immature Grans % 0.4 %      Neutrophils, Absolute 16.55 (H) 10*3/mm3      Lymphocytes, Absolute 2.92 10*3/mm3      Monocytes, Absolute 0.69 10*3/mm3      Eosinophils, Absolute 0.05 10*3/mm3      Basophils, Absolute 0.02 10*3/mm3      Immature Grans, Absolute 0.08 (H) 10*3/mm3     Lactic Acid, Plasma [[id number]]  (Normal) Collected:  06/01/17 1154    Specimen:  Blood Updated:  06/01/17 1232     Lactate 1.4 mmol/L     Blood Culture [[id number]]  (Normal) Collected:  05/31/17 2030    Specimen:  Blood from Arm, Left Updated:  06/01/17 2101     Blood Culture No growth at 24 hours    Blood Culture [[id number]]  (Normal) Collected:  05/31/17 2049    Specimen:  Blood from Arm, Left Updated:  06/01/17 2101     Blood Culture No growth at 24 hours    Clostridium Difficile Toxin, PCR [[id number]]  (Normal) Collected:  06/01/17 1955    Specimen:  Stool from Per Rectum Updated:  06/01/17 2358     C. Difficile Toxins by PCR Negative    CBC & Differential [[id number]] Collected:  06/02/17 0701    Specimen:  Blood Updated:  06/02/17 0749    Narrative:       The following orders were created for panel order CBC & Differential.  Procedure                               Abnormality         Status                     ---------                               -----------         ------                     CBC Auto Differential[[id number]]        Abnormal            Final result                 Please view results for these tests on the individual orders.    CBC Auto Differential [[id number]]  (Abnormal) Collected:  06/02/17 0701    Specimen:  Blood Updated:  06/02/17 0749     WBC 10.15 10*3/mm3      RBC 4.74 10*6/mm3      Hemoglobin 13.4 (L) g/dL      Hematocrit 39.6 (L) %      MCV 83.5 fL      MCH 28.3 pg      MCHC 33.8 g/dL      RDW 13.4 %      RDW-SD 40.5 fl      MPV 9.8 fL      Platelets 238 10*3/mm3      Neutrophil % 63.7 %      Lymphocyte % 25.6 %      Monocyte % 7.1 %      Eosinophil % 3.3 %      Basophil  "% 0.3 %      Immature Grans % 0.0 %      Neutrophils, Absolute 6.47 10*3/mm3      Lymphocytes, Absolute 2.60 10*3/mm3      Monocytes, Absolute 0.72 10*3/mm3      Eosinophils, Absolute 0.33 10*3/mm3      Basophils, Absolute 0.03 10*3/mm3      Immature Grans, Absolute 0.00 10*3/mm3     Basic Metabolic Panel [[id number]]  (Abnormal) Collected:  06/02/17 0701    Specimen:  Blood Updated:  06/02/17 0802     Glucose 99 mg/dL      BUN 6 mg/dL      Creatinine 0.89 mg/dL      Sodium 137 mmol/L      Potassium 3.6 mmol/L      Chloride 104 mmol/L      CO2 19.3 (L) mmol/L      Calcium 8.8 mg/dL      eGFR Non African Amer 109 mL/min/1.73      BUN/Creatinine Ratio 6.7 (L)     Anion Gap 13.7 mmol/L     Narrative:       GFR Normal >60  Chronic Kidney Disease <60  Kidney Failure <15    Respiratory Culture [[id number]] Collected:  06/01/17 0838    Specimen:  Sputum from Cough Updated:  06/02/17 1037     Respiratory Culture Light growth (2+) Normal Respiratory Eve     Gram Stain Result Rare (1+) WBCs seen      Rare (1+) Epithelial cells per low power field      Mixed bacterial morphotypes seen on Gram Stain        BP (!) 156/108 (BP Location: Left arm, Patient Position: Sitting)  Pulse 81  Temp 98.4 °F (36.9 °C) (Oral)   Resp 18  Ht 69\" (175.3 cm)  Wt 289 lb 6.4 oz (131 kg)  SpO2 96%  BMI 42.74 kg/m2    Discharge Exam:  General Appearance:    Alert, cooperative, no distress                          Head:    Normocephalic, without obvious abnormality, atraumatic                          Eyes:                            Throat:   Lips, tongue, gums normal                          Neck:   Supple, symmetrical, trachea midline, no JVD                        Lungs:     Clear to auscultation bilaterally, respirations unlabored                Chest Wall:    No tenderness or deformity                        Heart:    Regular rate and rhythm, S1 and S2 normal, no murmur,no  Rub  or gallop                  Abdomen:     Soft, non-tender, " bowel sounds active, no masses, no organomegaly                  Extremities:   Extremities normal, atraumatic, no cyanosis or edema                             Skin:   Skin is warm and dry,  no rashes or palpable lesions                  Neurologic:   no focal deficits noted     Disposition:  Home    Patient Instructions:    Angélica Bahenamary jo   Home Medication Instructions HEATHER:[id number]    Printed on:06/02/17 1347   Medication Information                      albuterol (PROVENTIL HFA;VENTOLIN HFA) 108 (90 BASE) MCG/ACT inhaler  Inhale 2 puffs every 4 (four) hours as needed for wheezing.             azithromycin (ZITHROMAX Z-RODDY) 250 MG tablet  Take 2 tablets the first day, then 1 tablet daily for 4 days.             benzonatate (TESSALON) 100 MG capsule  Take 1 capsule by mouth 3 (Three) Times a Day As Needed for Cough.             cephalexin (KEFLEX) 500 MG capsule  Take 1 capsule by mouth 4 (Four) Times a Day.             Fexofenadine HCl (ALLEGRA PO)  Take 180 mg by mouth daily.             guaiFENesin (MUCINEX) 600 MG 12 hr tablet  Take 1,200 mg by mouth 2 (Two) Times a Day.             HYDROcodone-acetaminophen (NORCO) 5-325 MG per tablet  Take 1 tablet by mouth Every 6 (Six) Hours As Needed for moderate pain (4-6).             ondansetron (ZOFRAN) 4 MG tablet  Take 1 tablet by mouth Every 8 (Eight) Hours As Needed for nausea.             pantoprazole (PROTONIX) 40 MG EC tablet  Take 1 tablet by mouth daily.             PARoxetine (PAXIL) 20 MG tablet  Take 1 tablet by mouth Every Morning.             promethazine-dextromethorphan (PROMETHAZINE-DM) 6.25-15 MG/5ML syrup  Take 5 mL by mouth 4 (four) times a day as needed for cough.               No future appointments.  Follow-up Information     Follow up with No Known Provider .    Why:  follow up with PCP in 1 week    Contact information:    [facility] [id number]          Discharge Order     Start     Ordered    06/02/17 1346  Discharge  patient  Once     Expected Discharge Date:  06/02/17    Discharge Disposition:  Home or Self Care        06/02/17 3341          Total time spent discharging patient including evaluation,post hospitalization follow up,  medication and post hospitalization instructions and education total time exceeds 30 minutes.    Signed:  Eduardo Rodriguez MD  6/2/2017  1:47 PM

## 2017-06-02 NOTE — PLAN OF CARE
Problem: Patient Care Overview (Adult)  Goal: Plan of Care Review  Outcome: Ongoing (interventions implemented as appropriate)    06/02/17 0320   Coping/Psychosocial Response Interventions   Plan Of Care Reviewed With patient   Patient Care Overview   Progress no change   Outcome Evaluation   Outcome Summary/Follow up Plan elevated heart rate when ambulating, all other vitals WNL, iv antibiotics and fluids continue, states no pain, pt up ad alyssa, isolation: contact, spore, droplet , stool speciman sent this shift, mother at bedside       Goal: Adult Individualization and Mutuality  Outcome: Ongoing (interventions implemented as appropriate)    Problem: Infection, Risk/Actual (Adult)  Goal: Infection Prevention/Resolution  Outcome: Ongoing (interventions implemented as appropriate)    Problem: Pneumonia (Adult)  Goal: Signs and Symptoms of Listed Potential Problems Will be Absent or Manageable (Pneumonia)  Outcome: Ongoing (interventions implemented as appropriate)

## 2017-06-02 NOTE — PROGRESS NOTES
Discharge Planning Assessment  [facility]     Patient Name: Padilla Bahena  MRN: [id number]  Today's Date: 6/2/2017    Admit Date: 5/31/2017          Discharge Needs Assessment       06/02/17 1024    Living Environment    Lives With parent(s)    Living Arrangements house    Home Accessibility no concerns    Stair Railings at Home none    Transportation Available car;family or friend will provide    Living Environment    Quality Of Family Relationships supportive    Able to Return to Prior Living Arrangements yes    Discharge Needs Assessment    Concerns To Be Addressed no discharge needs identified    Anticipated Changes Related to Illness none    Equipment Needed After Discharge none    Discharge Planning Comments Sister London Bahena [phone]            Discharge Plan       06/02/17 1025    Case Management/Social Work Plan    Plan Return home with mother    Patient/Family In Agreement With Plan yes    Additional Comments Spoke with patient at bedside.  Patient lives with his mother, is IADL, uses no DME.  He plans to return home at DC and does not anticipate any DC needs.  Patient does not have a PCP - given list of [facility] physicians.  He did not want CCP to make an appointment at present.  CCP will follow as needed.        Discharge Placement     No information found                Demographic Summary       06/02/17 1023    Referral Information    Admission Type inpatient    Arrived From admitted as an inpatient;home or self-care    Referral Source admission list    Reason For Consult discharge planning    Record Reviewed history and physical    Primary Care Physician Information    Name No PCP.  Given list of [facility] with contact information.  Patient declined assistance to make appointment at present.            Functional Status       06/02/17 1024    Functional Status Current    Ambulation 0-->independent    Transferring 0-->independent    Toileting 0-->independent    Bathing 0-->independent    Dressing  0-->independent    Eating 0-->independent    Communication 0-->understands/communicates without difficulty    Change in Functional Status Since Onset of Current Illness/Injury no    Functional Status Prior    Ambulation 0-->independent    Transferring 0-->independent    Toileting 0-->independent    Bathing 0-->independent    Dressing 0-->independent    Eating 0-->independent    Communication 0-->understands/communicates without difficulty    IADL    Medications independent    Meal Preparation assistive person   Lives with his mother    Housekeeping assistive person    Laundry assistive person    Shopping independent    Oral Care independent    Activity Tolerance    Current Activity Limitations none    Usual Activity Tolerance good    Current Activity Tolerance good    Cognitive/Perceptual/Developmental    Current Mental Status/Cognitive Functioning no deficits noted    Recent Changes in Mental Status/Cognitive Functioning no changes            Psychosocial     None            Abuse/Neglect     None            Legal     None            Substance Abuse     None            Patient Forms     None          Becky S. [place], RN

## 2017-06-02 NOTE — PLAN OF CARE
Problem: Patient Care Overview (Adult)  Goal: Plan of Care Review  Outcome: Outcome(s) achieved Date Met:  06/02/17 06/02/17 8289   Coping/Psychosocial Response Interventions   Plan Of Care Reviewed With patient;mother   Patient Care Overview   Progress improving   Outcome Evaluation   Outcome Summary/Follow up Plan pt improved-d/c'd to home today       Goal: Adult Individualization and Mutuality  Outcome: Outcome(s) achieved Date Met:  06/02/17    Problem: Infection, Risk/Actual (Adult)  Goal: Infection Prevention/Resolution  Outcome: Outcome(s) achieved Date Met:  06/02/17    Problem: Pneumonia (Adult)  Goal: Signs and Symptoms of Listed Potential Problems Will be Absent or Manageable (Pneumonia)  Outcome: Outcome(s) achieved Date Met:  06/02/17

## 2017-06-03 LAB
BACTERIA SPEC RESP CULT: NORMAL
GRAM STN SPEC: NORMAL

## 2017-06-05 LAB
BACTERIA SPEC AEROBE CULT: NORMAL
BACTERIA SPEC AEROBE CULT: NORMAL

## 2018-09-16 ENCOUNTER — HOSPITAL ENCOUNTER (EMERGENCY)
Facility: HOSPITAL | Age: 23
Discharge: HOME | End: 2018-09-16
Attending: EMERGENCY MEDICINE | Admitting: EMERGENCY MEDICINE

## 2018-09-16 ENCOUNTER — APPOINTMENT (OUTPATIENT)
Dept: GENERAL RADIOLOGY | Facility: HOSPITAL | Age: 23
End: 2018-09-16

## 2018-09-16 VITALS
HEART RATE: 92 BPM | HEIGHT: 71 IN | DIASTOLIC BLOOD PRESSURE: 71 MMHG | BODY MASS INDEX: 40.48 KG/M2 | TEMPERATURE: 97.4 F | SYSTOLIC BLOOD PRESSURE: 120 MMHG | RESPIRATION RATE: 16 BRPM | OXYGEN SATURATION: 95 % | WEIGHT: 289.13 LBS

## 2018-09-16 DIAGNOSIS — R05.9 COUGH: Primary | ICD-10-CM

## 2018-09-16 LAB
FLUAV AG NPH QL: NEGATIVE
FLUBV AG NPH QL IA: NEGATIVE

## 2018-09-16 PROCEDURE — 99283 EMERGENCY DEPT VISIT LOW MDM: CPT

## 2018-09-16 PROCEDURE — 71046 X-RAY EXAM CHEST 2 VIEWS: CPT

## 2018-09-16 PROCEDURE — 87804 INFLUENZA ASSAY W/OPTIC: CPT | Performed by: NURSE PRACTITIONER

## 2018-09-16 RX ORDER — BENZONATATE 100 MG/1
100 CAPSULE ORAL 3 TIMES DAILY PRN
Qty: 21 CAPSULE | Refills: 0 | Status: SHIPPED | OUTPATIENT
Start: 2018-09-16 | End: 2021-02-11

## 2018-09-16 RX ORDER — GUAIFENESIN 600 MG/1
1200 TABLET, EXTENDED RELEASE ORAL 2 TIMES DAILY
Qty: 14 TABLET | Refills: 0 | Status: SHIPPED | OUTPATIENT
Start: 2018-09-16 | End: 2021-03-12 | Stop reason: SDUPTHER

## 2018-09-16 RX ORDER — BUPROPION HYDROCHLORIDE 150 MG/1
150 TABLET, EXTENDED RELEASE ORAL DAILY
COMMUNITY

## 2018-09-16 NOTE — ED PROVIDER NOTES
EMERGENCY DEPARTMENT ENCOUNTER    CHIEF COMPLAINT  Chief Complaint: cough  History given by:patient  History limited by:nothing  Time Seen: 0640  Room Number: 12/12  PMD: Neyda Schmidt NP      HPI:  Pt is a 23 y.o. male who presents with cough, sore throat, sinus congestion x 2 weeks.  Pt states cough is productive at time with yellow sputum.  Pt denies fever, chills, nausea, vomiting, diarrhea.  Pt denies any sick contacts, recent travel or recent abx use.  Pt had pneumonia in may and is worried he has it again. Denies smoking or asthma hx.     Duration: x 2 weeks  Location:chest  Radiation:denies  Quality:cough  Intensity/Severity:mild  Progression:persistent  Associated Symptoms:sore throat, sinus congestion  Aggravating Factors:denie  Alleviating Factors:denies  Previous Episodes:hx of pneumonia  Treatment before arrival:nothing    PAST MEDICAL HISTORY  Active Ambulatory Problems     Diagnosis Date Noted   • Pneumonia 05/31/2017   • Sepsis (CMS/HCC) 05/31/2017   • Acidosis 05/31/2017   • Acute diarrhea 05/31/2017   • RLQ abdominal pain 05/31/2017     Resolved Ambulatory Problems     Diagnosis Date Noted   • No Resolved Ambulatory Problems     Past Medical History:   Diagnosis Date   • Anxiety    • Bronchitis, acute    • Environmental and seasonal allergies    • Gingivitis    • Pneumonia        PAST SURGICAL HISTORY  Past Surgical History:   Procedure Laterality Date   • TONSILLECTOMY         FAMILY HISTORY  Family History   Problem Relation Age of Onset   • Diabetes Mother    • Diabetes Father    • Hypertension Father    • Diabetes Maternal Grandmother        SOCIAL HISTORY  Social History     Social History   • Marital status: Single     Spouse name: N/A   • Number of children: N/A   • Years of education: N/A     Occupational History   • Not on file.     Social History Main Topics   • Smoking status: Never Smoker   • Smokeless tobacco: Never Used   • Alcohol use No   • Drug use: No   • Sexual activity:  Defer     Other Topics Concern   • Not on file     Social History Narrative   • No narrative on file         ALLERGIES  Dust mite extract and Pollen extract    REVIEW OF SYSTEMS  Review of Systems   Constitutional: Negative.  Negative for activity change, appetite change ( decreased), chills and fever.   HENT: Positive for congestion and sore throat. Negative for ear pain, rhinorrhea and sinus pressure.    Eyes: Negative.    Respiratory: Positive for cough. Negative for shortness of breath.    Cardiovascular: Negative.  Negative for chest pain, palpitations and leg swelling ( pedal).   Gastrointestinal: Negative for abdominal pain, diarrhea, nausea and vomiting.   Endocrine: Negative.    Genitourinary: Negative.  Negative for decreased urine volume, difficulty urinating, dysuria, frequency and urgency.   Musculoskeletal: Negative.  Negative for back pain.   Skin: Negative.  Negative for rash.   Allergic/Immunologic: Negative.    Neurological: Negative.  Negative for dizziness, weakness, light-headedness, numbness and headaches.   Hematological: Negative.    Psychiatric/Behavioral: Negative.  The patient is not nervous/anxious.    All other systems reviewed and are negative.      PHYSICAL EXAM  ED Triage Vitals [09/16/18 0556]   Temp Heart Rate Resp BP SpO2   97.4 °F (36.3 °C) 104 18 -- 95 %      Temp src Heart Rate Source Patient Position BP Location FiO2 (%)   Tympanic -- -- -- --       Physical Exam   Constitutional: He is well-developed, well-nourished, and in no distress. No distress.   HENT:   Head: Normocephalic.   Mouth/Throat: Oropharynx is clear and moist and mucous membranes are normal.   Eyes: Pupils are equal, round, and reactive to light.   Neck: Normal range of motion.   Cardiovascular: Normal rate, regular rhythm and normal heart sounds.    Pulmonary/Chest: Effort normal and breath sounds normal. He has no wheezes.   Abdominal: Soft. Bowel sounds are normal. There is no tenderness.   Musculoskeletal:  "Normal range of motion. He exhibits no edema.   Neurological: He is alert.   Skin: Skin is warm and dry. No rash noted.   Psychiatric: Mood, memory, affect and judgment normal.   Nursing note and vitals reviewed.      LAB RESULTS  Recent Results (from the past 24 hour(s))   Influenza Antigen, Rapid - Swab, Nasopharynx    Collection Time: 09/16/18  6:58 AM   Result Value Ref Range    Influenza A Ag, EIA Negative Negative    Influenza B Ag, EIA Negative Negative       I ordered the above labs and reviewed the results    RADIOLOGY  XR Chest 2 View   Final Result   Linear likely atelectasis in the left lower lung. Previous   CT demonstrated mediastinal adenopathy may be slightly decreased, as   described. Follow-up/further evaluation can be obtained as may be   indicated.       This report was finalized on 9/16/2018 6:54 AM by Dr. Efrem Salinas M.D.              I ordered the above noted radiological studies and reviewed the images on the PACS system.      PROGRESS AND CONSULTS    Reviewed pt's history and workup with Dr. contreras.   After a bedside evaluation; Dr contreras agrees with the plan of care    Discussed lab and x-ray results with patient.  Discussed plan of care including symptomatic treatment and follow up with primary care doctor.  Patient verbalized understanding and agrees with plan    COURSE & MEDICAL DECISION MAKING  Pertinent Labs and Imaging studies that were ordered and reviewed are noted above.  Results were reviewed/discussed with the patient and they were also made aware of online assess.   Pt also made aware that some labs, such as cultures, will not be resulted during ER visit and follow up with PMD is necessary.     MEDICATIONS GIVEN IN ER  Medications - No data to display    /98 (BP Location: Right arm, Patient Position: Lying)   Pulse 104   Temp 97.4 °F (36.3 °C) (Tympanic)   Resp 18   Ht 180.3 cm (71\")   Wt 131 kg (289 lb 2 oz)   SpO2 95%   BMI 40.32 kg/m²     Discussed all " results and noted any abnormalities with patient.  Discussed absoute need to recheck abnormalities with PMD    Reviewed implications of results, diagnosis, meds, responsibility to follow up, warning signs and symptoms of possible worsening, potential complications and reasons to return to ER with patient    Discussed plan for discharge, as there is no emergent indication for admission.  Pt is agreeable and understands need for follow up and repeat testing.  Pt is aware that discharge does not mean that nothing is wrong but it indicates no emergency is present and they must continue care with PMD.  Pt is discharged with instructions to follow up with primary care doctor to have their blood pressure rechecked.       DIAGNOSIS  Final diagnoses:   Cough       FOLLOW UP   Neyda Schmidt, NP  [address]  [address]  [place] [address]  [phone]               Carmella Pompa, APRN  09/16/18 0781

## 2018-09-16 NOTE — ED PROVIDER NOTES
Pt c/o cough that started about 2 weeks ago. Sometimes, it is productive with yellow sputum. He has also had sore throat secondary to the cough. He denies chest pain, dyspnea, abd pain, N/V/D, fevers, and chills.     Limited physical exam: well appearing, oropharynx is benign, heart is regular rate and rhythm, lungs are CTAB, abd is soft and nontender without rebound or guarding    CXR shows atelectasis in the left lower lung. Awaiting rapid flu swab before deciding treatment-> Flu swab is negative. Will treat symptomatically with prescriptions for tessalon and mucinex and have pt follow up with PMD for recheck. RTER warnings given.       MD ATTESTATION NOTE    The AUBREY and I have discussed this patient's history, physical exam, and treatment plan.  I have reviewed the documentation and personally had a face to face interaction with the patient. I affirm the documentation and agree with the treatment and plan.  The attached note describes my personal findings.      Documentation assistance provided by sudeep Guerrier for Dr Sánchez. Information recorded by the sudeep was done at my direction and has been verified and validated by me.        Elizabeth Guerrier  09/16/18 9758       Alfredo Sánchez MD  09/16/18 4157

## 2018-10-19 ENCOUNTER — APPOINTMENT (OUTPATIENT)
Dept: GENERAL RADIOLOGY | Facility: HOSPITAL | Age: 23
End: 2018-10-19

## 2018-10-19 ENCOUNTER — HOSPITAL ENCOUNTER (EMERGENCY)
Facility: HOSPITAL | Age: 23
Discharge: HOME | End: 2018-10-19
Attending: EMERGENCY MEDICINE | Admitting: EMERGENCY MEDICINE

## 2018-10-19 VITALS
OXYGEN SATURATION: 95 % | WEIGHT: 292.31 LBS | BODY MASS INDEX: 40.92 KG/M2 | HEIGHT: 71 IN | RESPIRATION RATE: 16 BRPM | HEART RATE: 78 BPM | SYSTOLIC BLOOD PRESSURE: 138 MMHG | TEMPERATURE: 98.3 F | DIASTOLIC BLOOD PRESSURE: 78 MMHG

## 2018-10-19 DIAGNOSIS — J02.9 PHARYNGITIS, UNSPECIFIED ETIOLOGY: Primary | ICD-10-CM

## 2018-10-19 LAB
FLUAV AG NPH QL: NEGATIVE
FLUBV AG NPH QL IA: NEGATIVE
HETEROPH AB SER QL LA: NEGATIVE
S PYO AG THROAT QL: NEGATIVE

## 2018-10-19 PROCEDURE — 87081 CULTURE SCREEN ONLY: CPT | Performed by: PHYSICIAN ASSISTANT

## 2018-10-19 PROCEDURE — 86308 HETEROPHILE ANTIBODY SCREEN: CPT | Performed by: PHYSICIAN ASSISTANT

## 2018-10-19 PROCEDURE — 99283 EMERGENCY DEPT VISIT LOW MDM: CPT

## 2018-10-19 PROCEDURE — 87880 STREP A ASSAY W/OPTIC: CPT | Performed by: PHYSICIAN ASSISTANT

## 2018-10-19 PROCEDURE — 87804 INFLUENZA ASSAY W/OPTIC: CPT | Performed by: PHYSICIAN ASSISTANT

## 2018-10-19 PROCEDURE — 71046 X-RAY EXAM CHEST 2 VIEWS: CPT

## 2018-10-19 RX ORDER — AZITHROMYCIN 250 MG/1
TABLET, FILM COATED ORAL
Qty: 6 TABLET | Refills: 0 | Status: SHIPPED | OUTPATIENT
Start: 2018-10-19 | End: 2021-02-11

## 2018-10-19 RX ORDER — METHYLPREDNISOLONE 4 MG/1
TABLET ORAL
Qty: 1 EACH | Refills: 0 | Status: SHIPPED | OUTPATIENT
Start: 2018-10-19 | End: 2021-02-11

## 2018-10-19 NOTE — ED NOTES
Pt to ED for CC of cough and sore throat since Sunday. Pt reports productive cough with green mucus. +vomiting      Toshia Hernandez, RN  10/19/18 8127

## 2018-10-19 NOTE — ED PROVIDER NOTES
EMERGENCY DEPARTMENT ENCOUNTER    CHIEF COMPLAINT  Chief Complaint: sore throat  History given by: patient  History limited by: nothing  Room Number: 15/15  PMD: Neyda Schmidt NP      HPI:  Pt is a 23 y.o. male who presents complaining of sore throat for the past five days. Pt also complains of a mild productive cough with green sputum. Pt denies fever, chills, chest pain, nausea, or vomiting. Pt denies hx of asthma. There are no other complaints at this time.    Duration: five days  Onset: gradual  Timing: constant  Location: throat  Quality: sore throat  Intensity/Severity: moderate  Progression: not specified  Associated Symptoms: productive cough with green sputum  Aggravating Factors: none specified  Alleviating Factors: none specified  Previous Episodes: none specified  Treatment before arrival: none specified    PAST MEDICAL HISTORY  Active Ambulatory Problems     Diagnosis Date Noted   • Pneumonia 05/31/2017   • Sepsis (CMS/HCC) 05/31/2017   • Acidosis 05/31/2017   • Acute diarrhea 05/31/2017   • RLQ abdominal pain 05/31/2017     Resolved Ambulatory Problems     Diagnosis Date Noted   • No Resolved Ambulatory Problems     Past Medical History:   Diagnosis Date   • Anxiety    • Bronchitis, acute    • Depression    • Environmental and seasonal allergies    • Gingivitis    • Pneumonia        PAST SURGICAL HISTORY  Past Surgical History:   Procedure Laterality Date   • TONSILLECTOMY         FAMILY HISTORY  Family History   Problem Relation Age of Onset   • Diabetes Mother    • Diabetes Father    • Hypertension Father    • Diabetes Maternal Grandmother        SOCIAL HISTORY  Social History     Social History   • Marital status: Single     Spouse name: N/A   • Number of children: N/A   • Years of education: N/A     Occupational History   • Not on file.     Social History Main Topics   • Smoking status: Never Smoker   • Smokeless tobacco: Never Used   • Alcohol use No   • Drug use: No   • Sexual activity:  Defer     Other Topics Concern   • Not on file     Social History Narrative   • No narrative on file       ALLERGIES  Dust mite extract and Pollen extract    REVIEW OF SYSTEMS  Review of Systems   Constitutional: Negative for activity change, appetite change, chills and fever.   HENT: Positive for sore throat. Negative for congestion.    Eyes: Negative.    Respiratory: Positive for cough (productive cough with green sputum). Negative for shortness of breath.    Cardiovascular: Negative for chest pain and leg swelling.   Gastrointestinal: Negative for abdominal pain, diarrhea, nausea and vomiting.   Endocrine: Negative.    Genitourinary: Negative for decreased urine volume and dysuria.   Musculoskeletal: Negative for neck pain.   Skin: Negative for rash and wound.   Allergic/Immunologic: Negative.    Neurological: Negative for weakness, numbness and headaches.   Hematological: Negative.    Psychiatric/Behavioral: Negative.    All other systems reviewed and are negative.      PHYSICAL EXAM  ED Triage Vitals   Temp Heart Rate Resp BP SpO2   10/19/18 0326 10/19/18 0326 10/19/18 0326 10/19/18 0410 10/19/18 0326   98.3 °F (36.8 °C) 118 16 142/81 92 %      Temp src Heart Rate Source Patient Position BP Location FiO2 (%)   10/19/18 0326 10/19/18 0326 10/19/18 0410 10/19/18 0410 --   Tympanic Monitor Lying Right arm        Physical Exam   Constitutional: He is oriented to person, place, and time. No distress.   HENT:   Head: Normocephalic and atraumatic.   Right Ear: Tympanic membrane, external ear and ear canal normal.   Left Ear: Tympanic membrane, external ear and ear canal normal.   Mouth/Throat: Posterior oropharyngeal erythema (mild) present.   Eyes: Pupils are equal, round, and reactive to light. EOM are normal.   Neck: Normal range of motion. Neck supple.   Cardiovascular: Normal rate, regular rhythm and normal heart sounds.  Exam reveals no gallop and no friction rub.    No murmur heard.  Pulmonary/Chest: Effort  normal and breath sounds normal. No respiratory distress. He has no decreased breath sounds. He has no wheezes. He has no rhonchi. He has no rales. He exhibits no tenderness.   Abdominal: Soft. He exhibits no distension and no mass. There is no tenderness. There is no rebound and no guarding.   Musculoskeletal: Normal range of motion. He exhibits no edema.   Neurological: He is alert and oriented to person, place, and time. He has normal sensation and normal strength.   Skin: Skin is warm and dry.   Psychiatric: Mood and affect normal.   Nursing note and vitals reviewed.      LAB RESULTS  Lab Results (last 24 hours)     Procedure Component Value Units Date/Time    Rapid Strep A Screen - Swab, Throat [[id number]]  (Normal) Collected:  10/19/18 0437    Specimen:  Swab from Throat Updated:  10/19/18 0509     Strep A Ag Negative    Influenza Antigen, Rapid - Swab, Nasopharynx [[id number]]  (Normal) Collected:  10/19/18 0437    Specimen:  Swab from Nasopharynx Updated:  10/19/18 0509     Influenza A Ag, EIA Negative     Influenza B Ag, EIA Negative    Beta Strep Culture, Throat - Swab, Throat [[id number]] Collected:  10/19/18 0437    Specimen:  Swab from Throat Updated:  10/19/18 0509    Mononucleosis Screen [[id number]]  (Normal) Collected:  10/19/18 0441    Specimen:  Blood Updated:  10/19/18 0510     Monospot Negative          I ordered the above labs and reviewed the results    RADIOLOGY  XR Chest 2 View   Final Result       1. Stable cardiomegaly. No acute findings.       This report was finalized on 10/19/2018 5:15 AM by Dr. Krystyna Obrien M.D.               I ordered the above noted radiological studies. Interpreted by radiologist. Reviewed by me in PACS.       PROCEDURES  Procedures      PROGRESS AND CONSULTS  0422 Ordered Rapid Strep A Screen, Mono Screen, and Influenza Screen for for further evaluation.    0450 Ordered CXR for further evaluation.    0550 Reviewed the patient's history and unremarkable workup  with Dr. Reji MD. After a bedside evaluation, they agree with the plan for discharge with a prescription for a Medrol Diogenes, Zithromax, and Dextromethorphan.     MEDICAL DECISION MAKING  Results were reviewed/discussed with the patient and they were also made aware of online access. Pt also made aware that some labs, such as cultures, will not be resulted during ER visit and follow up with PMD is necessary.     MDM  Number of Diagnoses or Management Options  Pharyngitis, unspecified etiology:      Amount and/or Complexity of Data Reviewed  Clinical lab tests: reviewed and ordered (Unremarkable)  Tests in the radiology section of CPT®: ordered and reviewed (CXR shows no acute findings.)  Decide to obtain previous medical records or to obtain history from someone other than the patient: yes  Review and summarize past medical records: yes (The patient was seen in the ED on 09/16/18 for a cough and sore throat.)  Independent visualization of images, tracings, or specimens: yes    Patient Progress  Patient progress: stable         DIAGNOSIS  Final diagnoses:   Pharyngitis, unspecified etiology       DISPOSITION  DISCHARGE    Patient discharged in stable condition.    Reviewed implications of results, diagnosis, meds, responsibility to follow up, warning signs and symptoms of possible worsening, potential complications and reasons to return to ER, including any new or worsening symptoms.    Patient/Family voiced understanding of above instructions.    Discussed plan for discharge, as there is no emergent indication for admission. Patient referred to primary care provider for BP management due to today's BP. Pt/family is agreeable and understands need for follow up and repeat testing.  Pt is aware that discharge does not mean that nothing is wrong but it indicates no emergency is present that requires admission and they must continue care with follow-up as given below or physician of their choice.     FOLLOW-UP  No  follow-up provider specified.       Medication List      New Prescriptions    azithromycin 250 MG tablet  Commonly known as:  ZITHROMAX Z-RODDY  Take 2 tablets the first day, then 1 tablet daily for 4 days.     dextromethorphan 15 MG/5ML syrup  Take 10 mL by mouth 4 (Four) Times a Day As Needed for Cough.     MethylPREDNISolone 4 MG tablet  Commonly known as:  MEDROL (RODDY)  Take as directed on package instructions.              Latest Documented Vital Signs:  As of 6:27 AM  BP- 138/78 HR- 78 Temp- 98.3 °F (36.8 °C) O2 sat- 95%    --  Documentation assistance provided by sudeep Ho for Errol Dale PA-C.  Information recorded by the scribe was done at my direction and has been verified and validated by me.     Mirna Ho  10/19/18 0552       Omar Dale III, PA  10/19/18 1053

## 2018-10-19 NOTE — ED PROVIDER NOTES
Pt presents to the ED c/o a productive cough with green sputum. Pt also complains of a sore throat. He denies fever, chills, chest pain, or any other sx. On exam, A&Ox3, no acute distress. Heart is RRR and without murmur, lungs are clear bilaterally. Abd is soft, non distended, non tender, positive bowel sounds. CXR and labs show NAD> I agree with the plan for d/c with rx for Zpak and steroids..     Attestation:  The AUBREY and I have discussed this patient's history, physical exam, and treatment plan.  I have reviewed the documentation and personally had a face to face interaction with the patient. I affirm the documentation and agree with the treatment and plan.  The attached note describes my personal findings.      Documentation assistance provided by sudeep Rodriguez for Dr Mendoza Information recorded by the sudeep was done at my direction and has been verified and validated by me.     Ashley Rodriguez  10/19/18 0545       Hubert Mendoza MD  10/19/18 0634

## 2018-10-21 LAB — BACTERIA SPEC AEROBE CULT: NORMAL

## 2021-02-11 ENCOUNTER — APPOINTMENT (OUTPATIENT)
Dept: CT IMAGING | Facility: HOSPITAL | Age: 26
End: 2021-02-11

## 2021-02-11 ENCOUNTER — HOSPITAL ENCOUNTER (EMERGENCY)
Facility: HOSPITAL | Age: 26
Discharge: HOME | End: 2021-02-11
Attending: EMERGENCY MEDICINE | Admitting: EMERGENCY MEDICINE

## 2021-02-11 VITALS
BODY MASS INDEX: 39.64 KG/M2 | OXYGEN SATURATION: 99 % | TEMPERATURE: 97.2 F | RESPIRATION RATE: 18 BRPM | SYSTOLIC BLOOD PRESSURE: 140 MMHG | HEART RATE: 80 BPM | HEIGHT: 72 IN | DIASTOLIC BLOOD PRESSURE: 96 MMHG

## 2021-02-11 DIAGNOSIS — R51.9 FRONTAL HEADACHE: Primary | ICD-10-CM

## 2021-02-11 PROCEDURE — 99281 EMR DPT VST MAYX REQ PHY/QHP: CPT

## 2021-02-11 PROCEDURE — 70450 CT HEAD/BRAIN W/O DYE: CPT

## 2021-02-11 PROCEDURE — 99282 EMERGENCY DEPT VISIT SF MDM: CPT
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
## 2021-02-12 NOTE — DISCHARGE INSTRUCTIONS
Tylenol and ibuprofen for pain control as needed, drink plenty of water, PCP follow-up as needed, ED return for worsening symptoms as needed.

## 2021-03-09 ENCOUNTER — APPOINTMENT (OUTPATIENT)
Dept: CT IMAGING | Facility: HOSPITAL | Age: 26
End: 2021-03-09

## 2021-03-09 ENCOUNTER — HOSPITAL ENCOUNTER (EMERGENCY)
Facility: HOSPITAL | Age: 26
Discharge: HOME | End: 2021-03-09
Attending: EMERGENCY MEDICINE | Admitting: EMERGENCY MEDICINE

## 2021-03-09 VITALS
TEMPERATURE: 97 F | WEIGHT: 300 LBS | HEIGHT: 72 IN | OXYGEN SATURATION: 96 % | SYSTOLIC BLOOD PRESSURE: 128 MMHG | BODY MASS INDEX: 40.63 KG/M2 | DIASTOLIC BLOOD PRESSURE: 84 MMHG | RESPIRATION RATE: 14 BRPM | HEART RATE: 81 BPM

## 2021-03-09 DIAGNOSIS — R10.84 ABDOMINAL PAIN, GENERALIZED: ICD-10-CM

## 2021-03-09 DIAGNOSIS — R19.7 NAUSEA VOMITING AND DIARRHEA: Primary | ICD-10-CM

## 2021-03-09 DIAGNOSIS — R11.2 NAUSEA VOMITING AND DIARRHEA: Primary | ICD-10-CM

## 2021-03-09 LAB
ALBUMIN SERPL-MCNC: 4.3 G/DL (ref 3.5–5.2)
ALBUMIN/GLOB SERPL: 1.1 G/DL
ALP SERPL-CCNC: 43 U/L (ref 39–117)
ALT SERPL W P-5'-P-CCNC: 56 U/L (ref 1–41)
ANION GAP SERPL CALCULATED.3IONS-SCNC: 10.5 MMOL/L (ref 5–15)
AST SERPL-CCNC: 44 U/L (ref 1–40)
BASOPHILS # BLD AUTO: 0.03 10*3/MM3 (ref 0–0.2)
BASOPHILS NFR BLD AUTO: 0.3 % (ref 0–1.5)
BILIRUB SERPL-MCNC: 0.6 MG/DL (ref 0–1.2)
BILIRUB UR QL STRIP: NEGATIVE
BUN SERPL-MCNC: 9 MG/DL (ref 6–20)
BUN/CREAT SERPL: 9.1 (ref 7–25)
CALCIUM SPEC-SCNC: 8.8 MG/DL (ref 8.6–10.5)
CHLORIDE SERPL-SCNC: 104 MMOL/L (ref 98–107)
CLARITY UR: CLEAR
CO2 SERPL-SCNC: 23.5 MMOL/L (ref 22–29)
COLOR UR: YELLOW
CREAT SERPL-MCNC: 0.99 MG/DL (ref 0.76–1.27)
DEPRECATED RDW RBC AUTO: 39.7 FL (ref 37–54)
EOSINOPHIL # BLD AUTO: 0.13 10*3/MM3 (ref 0–0.4)
EOSINOPHIL NFR BLD AUTO: 1.4 % (ref 0.3–6.2)
ERYTHROCYTE [DISTWIDTH] IN BLOOD BY AUTOMATED COUNT: 12.9 % (ref 12.3–15.4)
GFR SERPL CREATININE-BSD FRML MDRD: 93 ML/MIN/1.73
GLOBULIN UR ELPH-MCNC: 3.8 GM/DL
GLUCOSE SERPL-MCNC: 94 MG/DL (ref 65–99)
GLUCOSE UR STRIP-MCNC: NEGATIVE MG/DL
HCT VFR BLD AUTO: 44.9 % (ref 37.5–51)
HGB BLD-MCNC: 15.7 G/DL (ref 13–17.7)
HGB UR QL STRIP.AUTO: NEGATIVE
IMM GRANULOCYTES # BLD AUTO: 0.05 10*3/MM3 (ref 0–0.05)
IMM GRANULOCYTES NFR BLD AUTO: 0.5 % (ref 0–0.5)
KETONES UR QL STRIP: NEGATIVE
LEUKOCYTE ESTERASE UR QL STRIP.AUTO: NEGATIVE
LIPASE SERPL-CCNC: 22 U/L (ref 13–60)
LYMPHOCYTES # BLD AUTO: 2.06 10*3/MM3 (ref 0.7–3.1)
LYMPHOCYTES NFR BLD AUTO: 22.6 % (ref 19.6–45.3)
MCH RBC QN AUTO: 29.5 PG (ref 26.6–33)
MCHC RBC AUTO-ENTMCNC: 35 G/DL (ref 31.5–35.7)
MCV RBC AUTO: 84.4 FL (ref 79–97)
MONOCYTES # BLD AUTO: 0.96 10*3/MM3 (ref 0.1–0.9)
MONOCYTES NFR BLD AUTO: 10.5 % (ref 5–12)
NEUTROPHILS NFR BLD AUTO: 5.89 10*3/MM3 (ref 1.7–7)
NEUTROPHILS NFR BLD AUTO: 64.7 % (ref 42.7–76)
NITRITE UR QL STRIP: NEGATIVE
NRBC BLD AUTO-RTO: 0 /100 WBC (ref 0–0.2)
PH UR STRIP.AUTO: 6 [PH] (ref 5–8)
PLATELET # BLD AUTO: 264 10*3/MM3 (ref 140–450)
PMV BLD AUTO: 9.4 FL (ref 6–12)
POTASSIUM SERPL-SCNC: 3.6 MMOL/L (ref 3.5–5.2)
PROT SERPL-MCNC: 8.1 G/DL (ref 6–8.5)
PROT UR QL STRIP: NEGATIVE
RBC # BLD AUTO: 5.32 10*6/MM3 (ref 4.14–5.8)
SODIUM SERPL-SCNC: 138 MMOL/L (ref 136–145)
SP GR UR STRIP: 1.01 (ref 1–1.03)
UROBILINOGEN UR QL STRIP: NORMAL
WBC # BLD AUTO: 9.12 10*3/MM3 (ref 3.4–10.8)

## 2021-03-09 PROCEDURE — 25010000002 IOPAMIDOL 61 % SOLUTION: Performed by: EMERGENCY MEDICINE

## 2021-03-09 PROCEDURE — U0004 COV-19 TEST NON-CDC HGH THRU: HCPCS | Performed by: NURSE PRACTITIONER

## 2021-03-09 PROCEDURE — 80053 COMPREHEN METABOLIC PANEL: CPT | Performed by: NURSE PRACTITIONER

## 2021-03-09 PROCEDURE — 81003 URINALYSIS AUTO W/O SCOPE: CPT | Performed by: NURSE PRACTITIONER

## 2021-03-09 PROCEDURE — 74177 CT ABD & PELVIS W/CONTRAST: CPT

## 2021-03-09 PROCEDURE — 25010000002 ONDANSETRON PER 1 MG: Performed by: NURSE PRACTITIONER

## 2021-03-09 PROCEDURE — 85025 COMPLETE CBC W/AUTO DIFF WBC: CPT | Performed by: NURSE PRACTITIONER

## 2021-03-09 PROCEDURE — 83690 ASSAY OF LIPASE: CPT | Performed by: NURSE PRACTITIONER

## 2021-03-09 PROCEDURE — 96374 THER/PROPH/DIAG INJ IV PUSH: CPT

## 2021-03-09 PROCEDURE — 99284 EMERGENCY DEPT VISIT MOD MDM: CPT

## 2021-03-09 RX ORDER — ONDANSETRON 2 MG/ML
4 INJECTION INTRAMUSCULAR; INTRAVENOUS ONCE
Status: COMPLETED | OUTPATIENT
Start: 2021-03-09 | End: 2021-03-09

## 2021-03-09 RX ORDER — ONDANSETRON 4 MG/1
4 TABLET, ORALLY DISINTEGRATING ORAL EVERY 8 HOURS PRN
Qty: 10 TABLET | Refills: 0 | Status: SHIPPED | OUTPATIENT
Start: 2021-03-09

## 2021-03-09 RX ADMIN — ONDANSETRON 4 MG: 2 INJECTION INTRAMUSCULAR; INTRAVENOUS at 10:31

## 2021-03-09 RX ADMIN — SODIUM CHLORIDE 1000 ML: 9 INJECTION, SOLUTION INTRAVENOUS at 10:31

## 2021-03-09 RX ADMIN — IOPAMIDOL 85 ML: 612 INJECTION, SOLUTION INTRAVENOUS at 12:37

## 2021-03-09 NOTE — ED PROVIDER NOTES
EMERGENCY DEPARTMENT ENCOUNTER    Room Number:  04/04  Date of encounter:  3/9/2021  PCP: Neyda Schmidt NP  Historian: patient   Full history not obtainable due to: none     HPI:  Chief Complaint: NVD    Context: Padilla Bahena is a 26 y.o. male who presents to the ED c/o NVD onset Saturday, 3 days prior. Episodic, mild, 3-6 times per day. Tried some pepto bismol and imodium which helped his symptoms somewhat. Initially had fever of 101. No fever today. Associated generalized abdominal discomfort which seemed to worsen today. No recent abx use. No recent foreign travel. No known exposures or ill contacts.   No prior surgeries.       PAST MEDICAL HISTORY    Active Ambulatory Problems     Diagnosis Date Noted   • Pneumonia 05/31/2017   • Sepsis (CMS/HCC) 05/31/2017   • Acidosis 05/31/2017   • Acute diarrhea 05/31/2017   • RLQ abdominal pain 05/31/2017     Resolved Ambulatory Problems     Diagnosis Date Noted   • No Resolved Ambulatory Problems     Past Medical History:   Diagnosis Date   • Anxiety    • Bronchitis, acute    • Depression    • Environmental and seasonal allergies    • Gingivitis          PAST SURGICAL HISTORY  Past Surgical History:   Procedure Laterality Date   • TONSILLECTOMY           FAMILY HISTORY  Family History   Problem Relation Age of Onset   • Diabetes Mother    • Diabetes Father    • Hypertension Father    • Diabetes Maternal Grandmother          SOCIAL HISTORY  Social History     Socioeconomic History   • Marital status: Single     Spouse name: Not on file   • Number of children: Not on file   • Years of education: Not on file   • Highest education level: Not on file   Tobacco Use   • Smoking status: Never Smoker   • Smokeless tobacco: Never Used   Substance and Sexual Activity   • Alcohol use: No   • Drug use: No   • Sexual activity: Defer         ALLERGIES  Dust mite extract and Pollen extract        REVIEW OF SYSTEMS  Review of Systems   All systems reviewed and marked as negative  except as listed in HPI       PHYSICAL EXAM    I have reviewed the triage vital signs and nursing notes.    ED Triage Vitals   Temp Heart Rate Resp BP SpO2   03/09/21 0934 03/09/21 0934 03/09/21 0934 03/09/21 0937 03/09/21 0934   97 °F (36.1 °C) 104 16 132/82 96 %      Temp src Heart Rate Source Patient Position BP Location FiO2 (%)   -- -- -- -- --              GENERAL: Alert well developed, well nourished in no distress  HENT: NCAT, neck supple, trachea midline  EYES: no scleral icterus, PERRL, normal conjunctivae  CV: regular rhythm, regular rate, no murmur  RESPIRATORY: unlabored effort, CTAB  ABDOMEN: soft, no focal tenderness, nondistended, bowel sounds present  MUSCULOSKELETAL: no gross deformity  NEURO: alert,  sensory and motor function of extremities grossly intact, speech clear, mental status normal/baseline  SKIN: warm, dry, no rash  PSYCH:  Appropriate mood and affect    Vital signs and nursing notes reviewed.          LAB RESULTS  Recent Results (from the past 24 hour(s))   Comprehensive Metabolic Panel    Collection Time: 03/09/21 10:33 AM    Specimen: Blood   Result Value Ref Range    Glucose 94 65 - 99 mg/dL    BUN 9 6 - 20 mg/dL    Creatinine 0.99 0.76 - 1.27 mg/dL    Sodium 138 136 - 145 mmol/L    Potassium 3.6 3.5 - 5.2 mmol/L    Chloride 104 98 - 107 mmol/L    CO2 23.5 22.0 - 29.0 mmol/L    Calcium 8.8 8.6 - 10.5 mg/dL    Total Protein 8.1 6.0 - 8.5 g/dL    Albumin 4.30 3.50 - 5.20 g/dL    ALT (SGPT) 56 (H) 1 - 41 U/L    AST (SGOT) 44 (H) 1 - 40 U/L    Alkaline Phosphatase 43 39 - 117 U/L    Total Bilirubin 0.6 0.0 - 1.2 mg/dL    eGFR Non African Amer 93 >60 mL/min/1.73    Globulin 3.8 gm/dL    A/G Ratio 1.1 g/dL    BUN/Creatinine Ratio 9.1 7.0 - 25.0    Anion Gap 10.5 5.0 - 15.0 mmol/L   Lipase    Collection Time: 03/09/21 10:33 AM    Specimen: Blood   Result Value Ref Range    Lipase 22 13 - 60 U/L   Urinalysis With Microscopic If Indicated (No Culture) - Urine, Clean Catch    Collection Time:  03/09/21 10:33 AM    Specimen: Urine, Clean Catch   Result Value Ref Range    Color, UA Yellow Yellow, Straw    Appearance, UA Clear Clear    pH, UA 6.0 5.0 - 8.0    Specific Gravity, UA 1.015 1.005 - 1.030    Glucose, UA Negative Negative    Ketones, UA Negative Negative    Bilirubin, UA Negative Negative    Blood, UA Negative Negative    Protein, UA Negative Negative    Leuk Esterase, UA Negative Negative    Nitrite, UA Negative Negative    Urobilinogen, UA 0.2 E.U./dL 0.2 - 1.0 E.U./dL   CBC Auto Differential    Collection Time: 03/09/21 10:33 AM    Specimen: Blood   Result Value Ref Range    WBC 9.12 3.40 - 10.80 10*3/mm3    RBC 5.32 4.14 - 5.80 10*6/mm3    Hemoglobin 15.7 13.0 - 17.7 g/dL    Hematocrit 44.9 37.5 - 51.0 %    MCV 84.4 79.0 - 97.0 fL    MCH 29.5 26.6 - 33.0 pg    MCHC 35.0 31.5 - 35.7 g/dL    RDW 12.9 12.3 - 15.4 %    RDW-SD 39.7 37.0 - 54.0 fl    MPV 9.4 6.0 - 12.0 fL    Platelets 264 140 - 450 10*3/mm3    Neutrophil % 64.7 42.7 - 76.0 %    Lymphocyte % 22.6 19.6 - 45.3 %    Monocyte % 10.5 5.0 - 12.0 %    Eosinophil % 1.4 0.3 - 6.2 %    Basophil % 0.3 0.0 - 1.5 %    Immature Grans % 0.5 0.0 - 0.5 %    Neutrophils, Absolute 5.89 1.70 - 7.00 10*3/mm3    Lymphocytes, Absolute 2.06 0.70 - 3.10 10*3/mm3    Monocytes, Absolute 0.96 (H) 0.10 - 0.90 10*3/mm3    Eosinophils, Absolute 0.13 0.00 - 0.40 10*3/mm3    Basophils, Absolute 0.03 0.00 - 0.20 10*3/mm3    Immature Grans, Absolute 0.05 0.00 - 0.05 10*3/mm3    nRBC 0.0 0.0 - 0.2 /100 WBC       Ordered the above labs and independently reviewed the results.        RADIOLOGY  CT Abdomen Pelvis With Contrast    Result Date: 3/9/2021  CT ABDOMEN AND PELVIS WITH IV CONTRAST  HISTORY: 26-year-old male with generalized abdominal pain. Nausea and vomiting. Fever.  TECHNIQUE: Radiation dose reduction techniques were utilized, including automated exposure control and exposure modulation based on body size. 3 mm images were obtained through the abdomen and pelvis  after the administration of IV contrast. Compared with previous CT from 05/31/2017.  FINDINGS: There is a moderate degree of fatty infiltration of the liver. The gallbladder, spleen, pancreas, adrenals, and kidneys appear unremarkable. There is a paucity of formed stool within the colon which is either air-filled or fluid-filled. There is no definitive colonic thickening. The appendix appears within normal limits. There is no free fluid or lymphadenopathy.      1. The colon is devoid of formed stool and is either air or fluid-filled. There is no colonic thickening to suggest colitis. 2. Moderate hepatic steatosis.  Discussed with SONAM De La Rosa.        I ordered the above noted radiological studies. Independently reviewed by me and discussed with radiologist.  See dictation above for official radiology interpretation.      PROCEDURES    Procedures        MEDICATIONS GIVEN IN ER    Medications   sodium chloride 0.9 % bolus 1,000 mL (1,000 mL Intravenous New Bag 3/9/21 1031)   ondansetron (ZOFRAN) injection 4 mg (4 mg Intravenous Given 3/9/21 1031)   iopamidol (ISOVUE-300) 61 % injection 100 mL (85 mL Intravenous Given by Other 3/9/21 1237)         PROGRESS, DATA ANALYSIS, CONSULTS, AND MEDICAL DECISION MAKING    All labs have been independently reviewed by me.  All radiology studies have been reviewed by me.   EKG's independently reviewed by me.  Discussion below represents my analysis of pertinent findings related to patient's condition, differential diagnosis, treatment plan and final disposition.    DIFFERENTIAL DIAGNOSIS INCLUDE BUT NOT LIMITED TO: Gastroenteritis, flatus, appendicitis, pancreatitis, renal colic, nephrolithiasis, renal infarct, splenic infarct, mesenteric ischemia, perforated bowel, SBO, diverticulitis, colitis, abdominal wall pain, muscle spasm, IBS, biliary colic, cholecystitis      ED Course as of Mar 09 1338   Tue Mar 09, 2021   1300 I viewed CT scan of the abdomen pelvis on PACS  system.  My findings are no distention of the stomach.  No free air    [JS]   1336 BUN: 9 [JS]   1336 Creatinine: 0.99 [JS]   1336 Lipase: 22 [JS]   1336 WBC: 9.12 [JS]   1336 Hemoglobin: 15.7 [JS]   1337 Patient requested COVID-19 screening at time of disposition.  About hep screening will be sent at time of discharge.  He will be prescribed Zofran for nausea and vomiting and instructed to maintain a bland diet.  No vomiting is been observed in the emergency department.  He has been ambulatory.  He appears well and is appropriate for discharge.  Return precautions given.    [JS]      ED Course User Index  [JS] Crystal Aguilera, SONAM       AS OF 13:38 EST VITALS:        BP - 114/62  HR - 78  TEMP - 97 °F (36.1 °C)  O2 SATS - 94%         Medication List      New Prescriptions    ondansetron ODT 4 MG disintegrating tablet  Commonly known as: ZOFRAN-ODT  Place 1 tablet on the tongue Every 8 (Eight) Hours As Needed for Nausea or Vomiting.           Where to Get Your Medications      These medications were sent to [address] ([facility]), [place] - [address] [facility] - [phone]  - [phone] FX [address] [facility] ([facility]) [place] [address]    Phone: [phone]   · ondansetron ODT 4 MG disintegrating tablet           DIAGNOSIS  Final diagnoses:   Nausea vomiting and diarrhea   Abdominal pain, generalized         DISPOSITION  Discharge     Pt masked in first look. I wore appropriate PPE throughout my encounters with the pt. I performed hand hygiene on entry into the pt room and upon exit.     Dictated utilizing Dragon dictation:  Much of this encounter note is an electronic transcription/translation of spoken language to printed text. The electronic translation of spoken language may permit erroneous, or at times, nonsensical words or phrases to be inadvertently transcribed; Although I have reviewed the note for such errors, some may still exist.     Crystal Aguilera,  APRN  03/09/21 1332

## 2021-03-09 NOTE — ED NOTES
Patient has been having N/V/D x2 days with a fever Tmax 101. Patient is also having mild abdominal discomfort.     Kimberlee Su RN  03/09/21 9984

## 2021-03-09 NOTE — ED NOTES
Patient was placed in face mask in first look.  Patient was wearing a face mask throughout our encounter.  I wore protective eye protection throughout the encounter.  Hand hygiene was performed before and after patient encounter.          Andrea Sanchez RN  03/09/21 9749

## 2021-03-09 NOTE — ED PROVIDER NOTES
Pt presents to the ED c/o  nausea, vomiting, diarrhea onset 3 days ago.  Has nausea and vomiting have improved but he continues to have diarrhea that is watery.  He had fever on the first day up to 101.  He reports mild abdominal discomfort that is diffuse.     On exam,   Awake and alert, no acute distress.  Normal work of breathing.     Plan: Labs and CT abdomen pelvis reviewed and reassuring.  Presentation most consistent with acute gastroenteritis.  I discussed symptomatic care, need for aggressive oral hydration, and will prescribe Zofran as needed for nausea.  He will also be tested for Covid prior to discharge and was advised to self quarantine for 10 days or until he receives notification that his result was negative.      I wore an N95 mask, face shield, and gloves during this patient encounter.  Patient also wearing a surgical mask.  Hand hygeine performed before and after seeing the patient.     Attestation:  The AUBREY and I have discussed this patient's history, physical exam, and treatment plan.  I have reviewed the documentation and personally had a face to face interaction with the patient. I affirm the documentation and agree with the treatment and plan.  The attached note describes my personal findings.            Pavan Raya MD  03/09/21 8793

## 2021-03-09 NOTE — ED NOTES
Patient was placed in face mask in first look.  Patient was wearing a face mask throughout our encounter.  I wore protective eye protection throughout the encounter.  Hand hygiene was performed before and after patient encounter.        Rowan Gama RN  03/09/21 5009

## 2021-03-09 NOTE — DISCHARGE INSTRUCTIONS
Zofran as needed for nausea and vomiting.  Treat plenty fluids.  Norton diet as needed until having normal bowel movements.    Return for worsening symptoms or new concerns.  Return if worse or new concerns   Continue care with your primary care physician and have your blood pressure regularly checked and managed. Normal blood pressure is 120/80.

## 2021-03-10 ENCOUNTER — EPISODE CHANGES (OUTPATIENT)
Dept: CASE MANAGEMENT | Facility: OTHER | Age: 26
End: 2021-03-10

## 2021-03-10 LAB — SARS-COV-2 RNA RESP QL NAA+PROBE: NOT DETECTED

## 2021-03-12 ENCOUNTER — HOSPITAL ENCOUNTER (EMERGENCY)
Facility: HOSPITAL | Age: 26
Discharge: HOME | End: 2021-03-12
Attending: EMERGENCY MEDICINE | Admitting: EMERGENCY MEDICINE

## 2021-03-12 VITALS
DIASTOLIC BLOOD PRESSURE: 91 MMHG | OXYGEN SATURATION: 94 % | SYSTOLIC BLOOD PRESSURE: 136 MMHG | TEMPERATURE: 96.6 F | RESPIRATION RATE: 18 BRPM | HEART RATE: 59 BPM

## 2021-03-12 DIAGNOSIS — J01.00 ACUTE NON-RECURRENT MAXILLARY SINUSITIS: Primary | ICD-10-CM

## 2021-03-12 PROCEDURE — 99283 EMERGENCY DEPT VISIT LOW MDM: CPT

## 2021-03-12 RX ORDER — IBUPROFEN 800 MG/1
800 TABLET ORAL ONCE
Status: COMPLETED | OUTPATIENT
Start: 2021-03-12 | End: 2021-03-12

## 2021-03-12 RX ORDER — GUAIFENESIN 600 MG/1
600 TABLET, EXTENDED RELEASE ORAL 2 TIMES DAILY
Qty: 14 TABLET | Refills: 0 | Status: SHIPPED | OUTPATIENT
Start: 2021-03-12

## 2021-03-12 RX ORDER — IBUPROFEN 600 MG/1
600 TABLET ORAL EVERY 8 HOURS PRN
Qty: 21 TABLET | Refills: 0 | Status: SHIPPED | OUTPATIENT
Start: 2021-03-12

## 2021-03-12 RX ORDER — AZITHROMYCIN 250 MG/1
TABLET, FILM COATED ORAL
Qty: 6 TABLET | Refills: 0 | Status: SHIPPED | OUTPATIENT
Start: 2021-03-12

## 2021-03-12 RX ADMIN — IBUPROFEN 800 MG: 800 TABLET, FILM COATED ORAL at 19:29

## 2021-03-12 NOTE — ED PROVIDER NOTES
EMERGENCY DEPARTMENT ENCOUNTER  Patient was placed in face mask in first look and the following protective measures were taken unless additional measures were taken and documented below in the ED course. Patient was wearing facemask when I entered the room and throughout our encounter. I wore full protective equipment throughout this patient encounter including a face mask, and gloves. Hand hygiene was performed before donning protective equipment and after removal when leaving the room.    Room Number:  22/22  Date of encounter:  3/12/2021  PCP: Neyda Schmidt NP    HPI:  Context: Padilla Bahena is a 26 y.o. male who presents to the ED c/o chief complaint of 2 days of inferior nose pain.  Patient describes the pain as a pressure/pain that is underneath his nose and above his incisors.  He has noted a right earache today.  He has had rhinorrhea but no purulent nasal discharge.  He denies fever, chills or tooth ache.  He states that he saw a dentist today and a advised him that he does not have cavities.  He states that the dentist thought he may have a sinus infection.  He does have a history of allergic rhinitis for which he takes Allegra.  He does admit that he was here 2 days ago for diarrhea and that has resolved.      MEDICAL HISTORY REVIEW  Reviewed in [facility].  Patient was seen here on March 9, 2021 for an nausea, vomiting and diarrhea for 3 days.  He had a negative CT of the abdomen pelvis at that time.  He had a negative SARS-CoV-2 assay at that time.    PAST MEDICAL HISTORY  Active Ambulatory Problems     Diagnosis Date Noted   • Pneumonia 05/31/2017   • Sepsis (CMS/HCC) 05/31/2017   • Acidosis 05/31/2017   • Acute diarrhea 05/31/2017   • RLQ abdominal pain 05/31/2017     Resolved Ambulatory Problems     Diagnosis Date Noted   • No Resolved Ambulatory Problems     Past Medical History:   Diagnosis Date   • Anxiety    • Bronchitis, acute    • Depression    • Environmental and seasonal allergies    •  Gingivitis        PAST SURGICAL HISTORY  Past Surgical History:   Procedure Laterality Date   • TONSILLECTOMY         FAMILY HISTORY  Family History   Problem Relation Age of Onset   • Diabetes Mother    • Diabetes Father    • Hypertension Father    • Diabetes Maternal Grandmother        SOCIAL HISTORY  Social History     Socioeconomic History   • Marital status: Single     Spouse name: Not on file   • Number of children: Not on file   • Years of education: Not on file   • Highest education level: Not on file   Tobacco Use   • Smoking status: Never Smoker   • Smokeless tobacco: Never Used   Substance and Sexual Activity   • Alcohol use: No   • Drug use: No   • Sexual activity: Defer       ALLERGIES  Dust mite extract and Pollen extract    The patient's allergies have been reviewed    REVIEW OF SYSTEMS  All systems reviewed and negative except for those discussed in HPI.     PHYSICAL EXAM  I have reviewed the triage vital signs and nursing notes.  ED Triage Vitals   Temp Heart Rate Resp BP SpO2   03/12/21 1650 03/12/21 1650 03/12/21 1650 03/12/21 1712 03/12/21 1650   96.6 °F (35.9 °C) 74 18 150/92 94 %      Temp src Heart Rate Source Patient Position BP Location FiO2 (%)   -- -- -- -- --              General: Mild distress and anxious.  HENT: NCAT, PERRL, Nares patent.  He has bilateral cerumen impaction though I can see his right TM tympanic membrane and it appears normal.  He has maxillary and ethmoid sinuses are nontender to palpation.  He has significant erythema of the anterior aspect of his nares and nasal septum..  His oropharynx is normal appearance  Eyes: no scleral icterus, extraocular movements are intact  Neck: trachea midline, no ROM limitations.  No cervical lymphadenopathy  CV: regular rhythm, regular rate  Respiratory: normal effort, CTAB  Abdomen: soft, nondistended, nontender to palpation, no rebound tenderness, no guarding or rigidity  : deferred  Musculoskeletal: no deformity  Neuro: alert,  moves all extremities, follows commands  Skin: warm, dry    LAB RESULTS  No results found for this or any previous visit (from the past 24 hour(s)).    I ordered the above labs and reviewed the results.    RADIOLOGY  No Radiology Exams Resulted Within Past 24 Hours    I ordered the above noted radiological studies. I reviewed the images and results. I agree with the radiologist interpretation.    PROCEDURES  Procedures    MEDICATIONS GIVEN IN ER  Medications   ibuprofen (ADVIL,MOTRIN) tablet 800 mg (800 mg Oral Given 3/12/21 1929)       PROGRESS, DATA ANALYSIS, CONSULTS, AND MEDICAL DECISION MAKING  A complete history and physical exam have been performed.  All available laboratory and imaging results have been reviewed by myself prior to disposition.    MDM  After the initial H&P, I discussed pertinent information from history and physical exam with patient/family.  Discussed differential diagnosis.  Discussed plan for ED evaluation/work-up/treatment.  All questions answered.  Patient/family is agreeable with plan.  ED Course as of Mar 12 2125   Fri Mar 12, 2021   1918 I suspect that patient primarily has allergic rhinitis.  However his anterior nares do appear to be quite erythematous.  It is tender to palpation as is his frenulum.  Did look on the buccal aspect of his upper lip frenulum and it was normal in appearance.  I did not feel a abscess.  We talked about whether or not to place him on antibiotics and he would prefer to be placed on antibiotics.    [DE]      ED Course User Index  [DE] Joce Causey MD       AS OF 21:25 EST VITALS:    BP - 136/91  HR - 59  TEMP - 96.6 °F (35.9 °C)  O2 SATS - 94%    DIAGNOSIS  Final diagnoses:   Acute non-recurrent maxillary sinusitis         DISPOSITION    DISCHARGE    Patient discharged in stable condition.    Reviewed implications of results, diagnosis, meds, responsibility to follow up, warning signs and symptoms of possible worsening, potential complications and  reasons to return to ER.    Patient/Family voiced understanding of above instructions.    Discussed plan for discharge, as there is no emergent indication for admission. Patient referred to primary care provider for BP management due to today's BP. Pt/family is agreeable and understands need for follow up and repeat testing.  Pt is aware that discharge does not mean that nothing is wrong but it indicates no emergency is present that requires admission and they must continue care with follow-up as given below or physician of their choice.     FOLLOW-UP  Neyda Schmidt NP  [address]  [facility] [address]  [facility] [id number] [phone]    Schedule an appointment as soon as possible for a visit           Medication List      New Prescriptions    azithromycin 250 MG tablet  Commonly known as: ZITHROMAX  Take 2 pills on the first day and then 1 pill on days 2 through 5.     ibuprofen 600 MG tablet  Commonly known as: ADVIL,MOTRIN  Take 1 tablet by mouth Every 8 (Eight) Hours As Needed for Moderate Pain .        Changed    guaiFENesin 600 MG 12 hr tablet  Commonly known as: MUCINEX  Take 1 tablet by mouth 2 (Two) Times a Day.  What changed: how much to take           Where to Get Your Medications      These medications were sent to [facility] [address] [place] [address] ([facility]), [place] - [address] [facility] - [phone]  - [phone] FX [address] [facility] ([facility]) [place] [address]    Phone: [phone]   · azithromycin 250 MG tablet  · guaiFENesin 600 MG 12 hr tablet  · ibuprofen 600 MG tablet          Joce Causey MD  03/12/21 0650     normal (ped)...

## 2021-03-13 NOTE — DISCHARGE INSTRUCTIONS
Use medications as directed and follow-up with your family doctor.  You can use over-the-counter nasal saline to keep your nose moist.  Please return to the emergency department if symptoms worsen.

## 2021-03-13 NOTE — ED NOTES
Pt verbalized understanding to f/u with his pcp and to return to the ER if symptoms are worse.      Justin Jacobsen RN  03/12/21 1938

## 2021-04-16 ENCOUNTER — BULK ORDERING (OUTPATIENT)
Dept: CASE MANAGEMENT | Facility: OTHER | Age: 26
End: 2021-04-16

## 2021-04-16 DIAGNOSIS — Z23 IMMUNIZATION DUE: ICD-10-CM
